# Patient Record
Sex: MALE | Race: WHITE | NOT HISPANIC OR LATINO | Employment: UNEMPLOYED | URBAN - METROPOLITAN AREA
[De-identification: names, ages, dates, MRNs, and addresses within clinical notes are randomized per-mention and may not be internally consistent; named-entity substitution may affect disease eponyms.]

---

## 2022-08-18 ENCOUNTER — HOSPITAL ENCOUNTER (INPATIENT)
Facility: CLINIC | Age: 42
LOS: 4 days | Discharge: HOME | DRG: 164 | End: 2022-08-23
Attending: HOSPITALIST | Admitting: HOSPITALIST

## 2022-08-18 ENCOUNTER — TRANSFERRED RECORDS (OUTPATIENT)
Dept: HEALTH INFORMATION MANAGEMENT | Facility: CLINIC | Age: 42
End: 2022-08-18

## 2022-08-18 DIAGNOSIS — I26.99 PULMONARY EMBOLISM, OTHER, UNSPECIFIED CHRONICITY, UNSPECIFIED WHETHER ACUTE COR PULMONALE PRESENT (H): Primary | ICD-10-CM

## 2022-08-18 DIAGNOSIS — E11.69 TYPE 2 DIABETES MELLITUS WITH OTHER SPECIFIED COMPLICATION, UNSPECIFIED WHETHER LONG TERM INSULIN USE (H): ICD-10-CM

## 2022-08-18 DIAGNOSIS — I82.4Y1 ACUTE DEEP VEIN THROMBOSIS (DVT) OF PROXIMAL VEIN OF RIGHT LOWER EXTREMITY (H): ICD-10-CM

## 2022-08-18 DIAGNOSIS — I26.92 ACUTE SADDLE PULMONARY EMBOLISM WITHOUT ACUTE COR PULMONALE (H): ICD-10-CM

## 2022-08-18 LAB
ALBUMIN SERPL-MCNC: 2.3 G/DL (ref 3.4–5)
ALP SERPL-CCNC: 100 U/L (ref 40–150)
ALT SERPL W P-5'-P-CCNC: 30 U/L (ref 0–70)
ANION GAP SERPL CALCULATED.3IONS-SCNC: 5 MMOL/L (ref 3–14)
AST SERPL W P-5'-P-CCNC: 47 U/L (ref 0–45)
BILIRUB SERPL-MCNC: 3.1 MG/DL (ref 0.2–1.3)
BUN SERPL-MCNC: 7 MG/DL (ref 7–30)
CALCIUM SERPL-MCNC: 8.3 MG/DL (ref 8.5–10.1)
CHLORIDE BLD-SCNC: 102 MMOL/L (ref 94–109)
CO2 SERPL-SCNC: 26 MMOL/L (ref 20–32)
CREAT SERPL-MCNC: 0.64 MG/DL (ref 0.66–1.25)
ERYTHROCYTE [DISTWIDTH] IN BLOOD BY AUTOMATED COUNT: 12.3 % (ref 10–15)
GFR SERPL CREATININE-BSD FRML MDRD: >90 ML/MIN/1.73M2
GLUCOSE BLD-MCNC: 161 MG/DL (ref 70–99)
HCT VFR BLD AUTO: 44.2 % (ref 40–53)
HGB BLD-MCNC: 15.4 G/DL (ref 13.3–17.7)
INR PPP: 1.52 (ref 0.85–1.15)
MAGNESIUM SERPL-MCNC: 1.9 MG/DL (ref 1.6–2.3)
MCH RBC QN AUTO: 33.2 PG (ref 26.5–33)
MCHC RBC AUTO-ENTMCNC: 34.8 G/DL (ref 31.5–36.5)
MCV RBC AUTO: 95 FL (ref 78–100)
PLATELET # BLD AUTO: 115 10E3/UL (ref 150–450)
POTASSIUM BLD-SCNC: 4.3 MMOL/L (ref 3.4–5.3)
PROT SERPL-MCNC: 5.7 G/DL (ref 6.8–8.8)
RBC # BLD AUTO: 4.64 10E6/UL (ref 4.4–5.9)
SODIUM SERPL-SCNC: 133 MMOL/L (ref 133–144)
TROPONIN I SERPL HS-MCNC: 11 NG/L
WBC # BLD AUTO: 7.3 10E3/UL (ref 4–11)

## 2022-08-18 PROCEDURE — 99223 1ST HOSP IP/OBS HIGH 75: CPT | Mod: AI | Performed by: HOSPITALIST

## 2022-08-18 PROCEDURE — 84484 ASSAY OF TROPONIN QUANT: CPT | Performed by: HOSPITALIST

## 2022-08-18 PROCEDURE — 250N000011 HC RX IP 250 OP 636: Performed by: HOSPITALIST

## 2022-08-18 PROCEDURE — 85027 COMPLETE CBC AUTOMATED: CPT | Performed by: HOSPITALIST

## 2022-08-18 PROCEDURE — 36415 COLL VENOUS BLD VENIPUNCTURE: CPT | Performed by: HOSPITALIST

## 2022-08-18 PROCEDURE — 83735 ASSAY OF MAGNESIUM: CPT | Performed by: HOSPITALIST

## 2022-08-18 PROCEDURE — 80053 COMPREHEN METABOLIC PANEL: CPT | Performed by: HOSPITALIST

## 2022-08-18 PROCEDURE — 85610 PROTHROMBIN TIME: CPT | Performed by: HOSPITALIST

## 2022-08-18 PROCEDURE — 82040 ASSAY OF SERUM ALBUMIN: CPT | Performed by: HOSPITALIST

## 2022-08-18 RX ORDER — SODIUM CHLORIDE 9 MG/ML
INJECTION, SOLUTION INTRAVENOUS CONTINUOUS
Status: DISCONTINUED | OUTPATIENT
Start: 2022-08-19 | End: 2022-08-20

## 2022-08-18 RX ORDER — HEPARIN SODIUM 10000 [USP'U]/100ML
0-5000 INJECTION, SOLUTION INTRAVENOUS CONTINUOUS
Status: DISCONTINUED | OUTPATIENT
Start: 2022-08-18 | End: 2022-08-21

## 2022-08-18 RX ADMIN — HEPARIN SODIUM 1800 UNITS/HR: 10000 INJECTION, SOLUTION INTRAVENOUS at 23:22

## 2022-08-18 ASSESSMENT — ACTIVITIES OF DAILY LIVING (ADL): ADLS_ACUITY_SCORE: 26

## 2022-08-19 ENCOUNTER — APPOINTMENT (OUTPATIENT)
Dept: INTERVENTIONAL RADIOLOGY/VASCULAR | Facility: CLINIC | Age: 42
DRG: 164 | End: 2022-08-19
Attending: HOSPITALIST

## 2022-08-19 ENCOUNTER — APPOINTMENT (OUTPATIENT)
Dept: CARDIOLOGY | Facility: CLINIC | Age: 42
DRG: 164 | End: 2022-08-19
Attending: HOSPITALIST

## 2022-08-19 ENCOUNTER — APPOINTMENT (OUTPATIENT)
Dept: ULTRASOUND IMAGING | Facility: CLINIC | Age: 42
DRG: 164 | End: 2022-08-19
Attending: HOSPITALIST

## 2022-08-19 PROBLEM — I26.99 PULMONARY EMBOLISM (H): Status: ACTIVE | Noted: 2022-08-19

## 2022-08-19 LAB
ACT BLD: 233 SECONDS (ref 74–150)
ACT BLD: 237 SECONDS (ref 74–150)
ANION GAP SERPL CALCULATED.3IONS-SCNC: 6 MMOL/L (ref 3–14)
BUN SERPL-MCNC: 7 MG/DL (ref 7–30)
CALCIUM SERPL-MCNC: 8.2 MG/DL (ref 8.5–10.1)
CHLORIDE BLD-SCNC: 103 MMOL/L (ref 94–109)
CO2 SERPL-SCNC: 24 MMOL/L (ref 20–32)
CREAT SERPL-MCNC: 0.61 MG/DL (ref 0.66–1.25)
ERYTHROCYTE [DISTWIDTH] IN BLOOD BY AUTOMATED COUNT: 12.4 % (ref 10–15)
FACTOR 2 INTERPRETATION: ABNORMAL
FACTOR V INTERPRETATION: ABNORMAL
FOLATE SERPL-MCNC: 8.1 NG/ML (ref 4.6–34.8)
GFR SERPL CREATININE-BSD FRML MDRD: >90 ML/MIN/1.73M2
GLUCOSE BLD-MCNC: 138 MG/DL (ref 70–99)
HCT VFR BLD AUTO: 42.3 % (ref 40–53)
HGB BLD-MCNC: 14.3 G/DL (ref 13.3–17.7)
LAB DIRECTOR COMMENTS: ABNORMAL
LAB DIRECTOR DISCLAIMER: ABNORMAL
LAB DIRECTOR INTERPRETATION: ABNORMAL
LAB DIRECTOR METHODOLOGY: ABNORMAL
LAB DIRECTOR RESULTS: ABNORMAL
LVEF ECHO: NORMAL
MCH RBC QN AUTO: 32.6 PG (ref 26.5–33)
MCHC RBC AUTO-ENTMCNC: 33.8 G/DL (ref 31.5–36.5)
MCV RBC AUTO: 96 FL (ref 78–100)
PLATELET # BLD AUTO: 113 10E3/UL (ref 150–450)
POTASSIUM BLD-SCNC: 4.2 MMOL/L (ref 3.4–5.3)
RBC # BLD AUTO: 4.39 10E6/UL (ref 4.4–5.9)
SODIUM SERPL-SCNC: 133 MMOL/L (ref 133–144)
SPECIMEN DESCRIPTION: ABNORMAL
UFH PPP CHRO-ACNC: 0.11 IU/ML
UFH PPP CHRO-ACNC: 0.22 IU/ML
VIT B12 SERPL-MCNC: 1319 PG/ML (ref 232–1245)
WBC # BLD AUTO: 6.5 10E3/UL (ref 4–11)

## 2022-08-19 PROCEDURE — C1769 GUIDE WIRE: HCPCS

## 2022-08-19 PROCEDURE — 85520 HEPARIN ASSAY: CPT | Performed by: HOSPITALIST

## 2022-08-19 PROCEDURE — 250N000011 HC RX IP 250 OP 636: Performed by: NURSE PRACTITIONER

## 2022-08-19 PROCEDURE — 258N000003 HC RX IP 258 OP 636: Performed by: HOSPITALIST

## 2022-08-19 PROCEDURE — 37191 INS ENDOVAS VENA CAVA FILTR: CPT

## 2022-08-19 PROCEDURE — C1880 VENA CAVA FILTER: HCPCS

## 2022-08-19 PROCEDURE — 93005 ELECTROCARDIOGRAM TRACING: CPT

## 2022-08-19 PROCEDURE — 99222 1ST HOSP IP/OBS MODERATE 55: CPT | Performed by: INTERNAL MEDICINE

## 2022-08-19 PROCEDURE — 93010 ELECTROCARDIOGRAM REPORT: CPT | Performed by: INTERNAL MEDICINE

## 2022-08-19 PROCEDURE — 210N000002 HC R&B HEART CARE

## 2022-08-19 PROCEDURE — 36415 COLL VENOUS BLD VENIPUNCTURE: CPT | Performed by: HOSPITALIST

## 2022-08-19 PROCEDURE — 255N000002 HC RX 255 OP 636: Performed by: HOSPITALIST

## 2022-08-19 PROCEDURE — 250N000011 HC RX IP 250 OP 636: Performed by: RADIOLOGY

## 2022-08-19 PROCEDURE — 272N000209 HC BALLOON (NON-PTA) CR6

## 2022-08-19 PROCEDURE — 85027 COMPLETE CBC AUTOMATED: CPT | Performed by: HOSPITALIST

## 2022-08-19 PROCEDURE — 255N000002 HC RX 255 OP 636: Performed by: RADIOLOGY

## 2022-08-19 PROCEDURE — 36014 PLACE CATHETER IN ARTERY: CPT

## 2022-08-19 PROCEDURE — 272N000116 HC CATH CR1

## 2022-08-19 PROCEDURE — 83036 HEMOGLOBIN GLYCOSYLATED A1C: CPT | Performed by: HOSPITALIST

## 2022-08-19 PROCEDURE — 250N000009 HC RX 250: Performed by: NURSE PRACTITIONER

## 2022-08-19 PROCEDURE — 93306 TTE W/DOPPLER COMPLETE: CPT | Mod: 26 | Performed by: INTERNAL MEDICINE

## 2022-08-19 PROCEDURE — 85520 HEPARIN ASSAY: CPT | Performed by: INTERNAL MEDICINE

## 2022-08-19 PROCEDURE — 86146 BETA-2 GLYCOPROTEIN ANTIBODY: CPT | Performed by: INTERNAL MEDICINE

## 2022-08-19 PROCEDURE — 93971 EXTREMITY STUDY: CPT | Mod: LT

## 2022-08-19 PROCEDURE — 82746 ASSAY OF FOLIC ACID SERUM: CPT | Performed by: INTERNAL MEDICINE

## 2022-08-19 PROCEDURE — 86147 CARDIOLIPIN ANTIBODY EA IG: CPT | Performed by: INTERNAL MEDICINE

## 2022-08-19 PROCEDURE — 82607 VITAMIN B-12: CPT | Performed by: INTERNAL MEDICINE

## 2022-08-19 PROCEDURE — 36015 PLACE CATHETER IN ARTERY: CPT

## 2022-08-19 PROCEDURE — G0452 MOLECULAR PATHOLOGY INTERPR: HCPCS | Mod: 26 | Performed by: PATHOLOGY

## 2022-08-19 PROCEDURE — 272N000194 HC ACCESSORY CR3

## 2022-08-19 PROCEDURE — 272N000566 HC SHEATH CR3

## 2022-08-19 PROCEDURE — 81240 F2 GENE: CPT | Performed by: INTERNAL MEDICINE

## 2022-08-19 PROCEDURE — 85347 COAGULATION TIME ACTIVATED: CPT

## 2022-08-19 PROCEDURE — 36415 COLL VENOUS BLD VENIPUNCTURE: CPT | Performed by: INTERNAL MEDICINE

## 2022-08-19 PROCEDURE — 999N000208 ECHOCARDIOGRAM COMPLETE

## 2022-08-19 PROCEDURE — 250N000011 HC RX IP 250 OP 636: Performed by: HOSPITALIST

## 2022-08-19 PROCEDURE — 82310 ASSAY OF CALCIUM: CPT | Performed by: HOSPITALIST

## 2022-08-19 PROCEDURE — C1757 CATH, THROMBECTOMY/EMBOLECT: HCPCS

## 2022-08-19 PROCEDURE — 99233 SBSQ HOSP IP/OBS HIGH 50: CPT | Performed by: INTERNAL MEDICINE

## 2022-08-19 PROCEDURE — 272N000196 HC ACCESSORY CR5

## 2022-08-19 PROCEDURE — 99152 MOD SED SAME PHYS/QHP 5/>YRS: CPT

## 2022-08-19 RX ORDER — NALOXONE HYDROCHLORIDE 0.4 MG/ML
0.4 INJECTION, SOLUTION INTRAMUSCULAR; INTRAVENOUS; SUBCUTANEOUS
Status: DISCONTINUED | OUTPATIENT
Start: 2022-08-19 | End: 2022-08-19 | Stop reason: HOSPADM

## 2022-08-19 RX ORDER — IOPAMIDOL 612 MG/ML
100 INJECTION, SOLUTION INTRAVASCULAR ONCE
Status: COMPLETED | OUTPATIENT
Start: 2022-08-19 | End: 2022-08-19

## 2022-08-19 RX ORDER — NALOXONE HYDROCHLORIDE 0.4 MG/ML
0.2 INJECTION, SOLUTION INTRAMUSCULAR; INTRAVENOUS; SUBCUTANEOUS
Status: DISCONTINUED | OUTPATIENT
Start: 2022-08-19 | End: 2022-08-23 | Stop reason: HOSPADM

## 2022-08-19 RX ORDER — LACTULOSE 10 G/15ML
30 SOLUTION ORAL 3 TIMES DAILY
COMMUNITY

## 2022-08-19 RX ORDER — FUROSEMIDE 40 MG
40 TABLET ORAL EVERY MORNING
COMMUNITY

## 2022-08-19 RX ORDER — AMOXICILLIN 250 MG
1 CAPSULE ORAL 2 TIMES DAILY PRN
Status: DISCONTINUED | OUTPATIENT
Start: 2022-08-19 | End: 2022-08-23 | Stop reason: HOSPADM

## 2022-08-19 RX ORDER — FLUMAZENIL 0.1 MG/ML
0.2 INJECTION, SOLUTION INTRAVENOUS
Status: DISCONTINUED | OUTPATIENT
Start: 2022-08-19 | End: 2022-08-19 | Stop reason: HOSPADM

## 2022-08-19 RX ORDER — NALOXONE HYDROCHLORIDE 0.4 MG/ML
0.4 INJECTION, SOLUTION INTRAMUSCULAR; INTRAVENOUS; SUBCUTANEOUS
Status: DISCONTINUED | OUTPATIENT
Start: 2022-08-19 | End: 2022-08-23 | Stop reason: HOSPADM

## 2022-08-19 RX ORDER — HEPARIN SODIUM 1000 [USP'U]/ML
1000-10000 INJECTION, SOLUTION INTRAVENOUS; SUBCUTANEOUS ONCE
Status: COMPLETED | OUTPATIENT
Start: 2022-08-19 | End: 2022-08-19

## 2022-08-19 RX ORDER — FENTANYL CITRATE 50 UG/ML
25-50 INJECTION, SOLUTION INTRAMUSCULAR; INTRAVENOUS EVERY 5 MIN PRN
Status: DISCONTINUED | OUTPATIENT
Start: 2022-08-19 | End: 2022-08-19 | Stop reason: HOSPADM

## 2022-08-19 RX ORDER — LIDOCAINE 40 MG/G
CREAM TOPICAL
Status: DISCONTINUED | OUTPATIENT
Start: 2022-08-19 | End: 2022-08-23 | Stop reason: HOSPADM

## 2022-08-19 RX ORDER — SPIRONOLACTONE 100 MG/1
100 TABLET, FILM COATED ORAL EVERY MORNING
COMMUNITY

## 2022-08-19 RX ORDER — ONDANSETRON 2 MG/ML
4 INJECTION INTRAMUSCULAR; INTRAVENOUS EVERY 6 HOURS PRN
Status: DISCONTINUED | OUTPATIENT
Start: 2022-08-19 | End: 2022-08-23 | Stop reason: HOSPADM

## 2022-08-19 RX ORDER — NALOXONE HYDROCHLORIDE 0.4 MG/ML
0.2 INJECTION, SOLUTION INTRAMUSCULAR; INTRAVENOUS; SUBCUTANEOUS
Status: DISCONTINUED | OUTPATIENT
Start: 2022-08-19 | End: 2022-08-19 | Stop reason: HOSPADM

## 2022-08-19 RX ORDER — ACETAMINOPHEN 325 MG/1
325 TABLET ORAL EVERY 6 HOURS PRN
Status: DISCONTINUED | OUTPATIENT
Start: 2022-08-19 | End: 2022-08-23 | Stop reason: HOSPADM

## 2022-08-19 RX ORDER — AMOXICILLIN 250 MG
2 CAPSULE ORAL 2 TIMES DAILY PRN
Status: DISCONTINUED | OUTPATIENT
Start: 2022-08-19 | End: 2022-08-23 | Stop reason: HOSPADM

## 2022-08-19 RX ORDER — HEPARIN SODIUM 200 [USP'U]/100ML
1 INJECTION, SOLUTION INTRAVENOUS CONTINUOUS PRN
Status: DISCONTINUED | OUTPATIENT
Start: 2022-08-19 | End: 2022-08-19 | Stop reason: HOSPADM

## 2022-08-19 RX ORDER — ONDANSETRON 4 MG/1
4 TABLET, ORALLY DISINTEGRATING ORAL EVERY 6 HOURS PRN
Status: DISCONTINUED | OUTPATIENT
Start: 2022-08-19 | End: 2022-08-23 | Stop reason: HOSPADM

## 2022-08-19 RX ORDER — HYDROMORPHONE HCL IN WATER/PF 6 MG/30 ML
0.2 PATIENT CONTROLLED ANALGESIA SYRINGE INTRAVENOUS
Status: DISCONTINUED | OUTPATIENT
Start: 2022-08-19 | End: 2022-08-23 | Stop reason: HOSPADM

## 2022-08-19 RX ADMIN — MIDAZOLAM HYDROCHLORIDE 1 MG: 1 INJECTION, SOLUTION INTRAMUSCULAR; INTRAVENOUS at 20:45

## 2022-08-19 RX ADMIN — FENTANYL CITRATE 50 MCG: 50 INJECTION, SOLUTION INTRAMUSCULAR; INTRAVENOUS at 20:09

## 2022-08-19 RX ADMIN — FENTANYL CITRATE 50 MCG: 50 INJECTION, SOLUTION INTRAMUSCULAR; INTRAVENOUS at 19:51

## 2022-08-19 RX ADMIN — IOPAMIDOL 148 ML: 612 INJECTION, SOLUTION INTRAVENOUS at 21:27

## 2022-08-19 RX ADMIN — FENTANYL CITRATE 50 MCG: 50 INJECTION, SOLUTION INTRAMUSCULAR; INTRAVENOUS at 19:46

## 2022-08-19 RX ADMIN — MIDAZOLAM HYDROCHLORIDE 1 MG: 1 INJECTION, SOLUTION INTRAMUSCULAR; INTRAVENOUS at 19:51

## 2022-08-19 RX ADMIN — MIDAZOLAM HYDROCHLORIDE 1 MG: 1 INJECTION, SOLUTION INTRAMUSCULAR; INTRAVENOUS at 19:46

## 2022-08-19 RX ADMIN — HUMAN ALBUMIN MICROSPHERES AND PERFLUTREN 3 ML: 10; .22 INJECTION, SOLUTION INTRAVENOUS at 09:19

## 2022-08-19 RX ADMIN — LIDOCAINE HYDROCHLORIDE 20 ML: 10 INJECTION, SOLUTION INFILTRATION; PERINEURAL at 19:46

## 2022-08-19 RX ADMIN — HEPARIN SODIUM 2250 UNITS/HR: 10000 INJECTION, SOLUTION INTRAVENOUS at 22:15

## 2022-08-19 RX ADMIN — SODIUM CHLORIDE: 9 INJECTION, SOLUTION INTRAVENOUS at 01:08

## 2022-08-19 RX ADMIN — HEPARIN SODIUM 5 BAG: 200 INJECTION, SOLUTION INTRAVENOUS at 20:06

## 2022-08-19 RX ADMIN — HEPARIN SODIUM 1000 UNITS: 1000 INJECTION INTRAVENOUS; SUBCUTANEOUS at 20:40

## 2022-08-19 RX ADMIN — HEPARIN SODIUM 2100 UNITS/HR: 10000 INJECTION, SOLUTION INTRAVENOUS at 10:12

## 2022-08-19 RX ADMIN — FENTANYL CITRATE 50 MCG: 50 INJECTION, SOLUTION INTRAMUSCULAR; INTRAVENOUS at 20:45

## 2022-08-19 RX ADMIN — SODIUM CHLORIDE: 9 INJECTION, SOLUTION INTRAVENOUS at 13:46

## 2022-08-19 RX ADMIN — MIDAZOLAM HYDROCHLORIDE 1 MG: 1 INJECTION, SOLUTION INTRAMUSCULAR; INTRAVENOUS at 20:09

## 2022-08-19 ASSESSMENT — ACTIVITIES OF DAILY LIVING (ADL)
ADLS_ACUITY_SCORE: 26
ADLS_ACUITY_SCORE: 28
ADLS_ACUITY_SCORE: 26
ADLS_ACUITY_SCORE: 26

## 2022-08-19 NOTE — PROGRESS NOTES
Admitted to Room 274 from OSH at 2230.   Neuro: A&Ox4, denies pain. Neuros intact.   CV: ST  Respiratory: on RA  GI/: uses urinal   Skin: intact  Activity: SBA   Diet: NPO at midnight   Drips: started Hep gtt @1800   Plan: Waiting for MD orders. Report given to nurse taking over at 2300.

## 2022-08-19 NOTE — CONSULTS
Interventional Radiology - Pre-Procedure Note:  8/19/2022    Procedure Requested: Possible Pete pulmonary thrombectomy  Requested by: Dr Pino     Brief HPI:Jareth Limon is a 42 year old male with a past medical history of hypertension, alcoholic liver cirrhosis with ascites who presented to hospital with right lower extremity swelling and pain found to have submassive PE without right heart strain and right lower extremity DVT.    Anil was seen in his room today. His breathing and right leg pain are normal at bedrest.  He has noted some increase in SOB for the past few weeks but it has become worse. His right leg swelling though has been bothersome for ~ 1 week and the pain in his posterior calf and thigh became worse the past few days. He has some numbness and tingling in his right foot. His pain is worst with getting up out a lying situation. He wanted to know if IR was going to remove blood clots in his right leg too since that was so painful.     IMAGING:  EXAM: US LOWER EXTREMITY VENOUS DUPLEX LEFT  LOCATION: [facility]  DATE/TIME: 8/19/2022 6:10 AM     INDICATION: patient with pe and right lower extremity dvt, noted to have left leg swelling too.  COMPARISON: None.  TECHNIQUE: Venous Duplex ultrasound of the left lower extremity with and without compression, augmentation and duplex. Color flow and spectral Doppler with waveform analysis performed.     FINDINGS: Exam includes the common femoral, femoral, popliteal, and contralateral common femoral veins as well as segmentally visualized deep calf veins and greater saphenous vein.      LEFT: No deep vein thrombosis. No superficial thrombophlebitis. No popliteal cyst.  Nonocclusive DVT noted in the right common femoral vein. Patient has known DVT right leg.                                                                   IMPRESSION:  1.  No deep venous thrombosis in the left lower extremity.  2.  Nonocclusive thrombus right common  femoral vein. Remainder of right leg not evaluated.    CT chest  Bilateral submassive PE     [facility]  Echocardiography Laboratory  [address] [place] [address]     Name: NICK HENDERSON  MRN: [id number]  : 1980  Study Date: 2022 08:49 AM  Age: 42 yrs  Gender: Male  Patient Location: [facility]  Reason For Study: Pulmonary Embolism  Ordering Physician: KASI TAN  Referring Physician: Tiago Galeana  Performed By: Treasure Davidson RDCS     BSA: 2.7 m2  Height: 74 in  Weight: 327 lb  HR: 111  BP: 143/93 mmHg  _______________________________________________________________  Procedure  Complete Echo Adult. Optison (NDC #[id number]) given intravenously. Technically  difficult study.  ________________________________________________________________  Interpretation Summary     The rhythm was sinus tachycardia.  The visual ejection fraction is 60-65%.  The right ventricle is normal in size and function.  Right ventricular systolic pressure could not be approximated due to  inadequate tricuspid regurgitation.  Dilated pulmonary arteries. Main PA measures 4 cms.  No significant valve disease.  There is no pericardial effusion.  Technically difficult study.    NPO: Yes  ANTICOAGULANTS: Yes, IV Heparin    Medications Prior to Admission   Medication Sig Dispense Refill Last Dose     furosemide (LASIX) 40 MG tablet Take 40 mg by mouth every morning        lactulose (CHRONULAC) 10 GM/15ML solution Take 30 g by mouth 3 times daily   few weeks ago - ran out     metFORMIN (GLUCOPHAGE) 500 MG tablet Take 500 mg by mouth 2 times daily (with meals)        spironolactone (ALDACTONE) 100 MG tablet Take 100 mg by mouth every morning        ALLERGIES  Allergies   Allergen Reactions     Amoxicillin Hives     Per patient     LABS:  ROUTINE ICU LABS (Last four results)  CMPRecent Labs   Lab 22  0751 22  2251    133   POTASSIUM 4.2 4.3   CHLORIDE 103 102   CO2 24  26   ANIONGAP 6 5   * 161*   BUN 7 7   CR 0.61* 0.64*   GFRESTIMATED >90 >90   SHERI 8.2* 8.3*   MAG  --  1.9   PROTTOTAL  --  5.7*   ALBUMIN  --  2.3*   BILITOTAL  --  3.1*   ALKPHOS  --  100   AST  --  47*   ALT  --  30     CBC  Recent Labs   Lab 08/19/22  0751 08/18/22  2251   WBC 6.5 7.3   RBC 4.39* 4.64   HGB 14.3 15.4   HCT 42.3 44.2   MCV 96 95   MCH 32.6 33.2*   MCHC 33.8 34.8   RDW 12.4 12.3   * 115*     INR  Recent Labs   Lab 08/18/22  2251   INR 1.52*     Arterial Blood GasNo lab results found in last 7 days.    EXAM:  Temp:  [98.3  F (36.8  C)-98.4  F (36.9  C)] 98.4  F (36.9  C)  Pulse:  [105-111] 110  Resp:  [18-23] 22  BP: ()/(59-93) 98/59  SpO2:  [91 %-99 %] 91 %     General: Careful, cooperative male in no acute distress.    Neuro:  A&O x 3. Moves all extremities equally.  Resp:  Lungs clear to auscultation bilaterally.  Cardio:  S1S2 and reg, tachycardic, without murmur, clicks or rubs  Abdomen:  Soft, distended, non-tender, positive bowel sounds.  Vascular: +2/4 bilateral dorsalis pedis pulses     Right leg > than the left leg and legs soft. Puffy edema on right foot. He denies having issues with edema.   Skin:  Without excoriations, ecchymosis, erythema, lesions or open sores.  MSK:  No gross motor weakness.  Sensation intact.  Proprioception intact.    Pre-Sedation Code Status Assessment:  Code Status: Full Code intra procedure, per chart review. .     History and Physical Reviewed: H&P documented within 30 days.  I have personally reviewed the patient's medical history and have updated the medical record as necessary.    ASSESSMENT/PLAN: Jareth Limon is a 42 year old male with a past medical history of hypertension, alcoholic liver cirrhosis with ascites who presented to hospital with right lower extremity swelling and pain found to have submassive PE without right heart strain and right lower extremity DVT.    -Patient is stable at rest but with activity he has SOB and right  leg pain.   -Continue NPO today for PE thrombectomy    Reviewed patient's notes, labs and imaging with IR Dr Sloan. He is here on Monday and will follow on the progress of Jareth. He may consider a RLE thrombectomy at that time.     Procedure, risks/benefits, details, alternatives, and sedation reviewed with patient who verbalized understanding. All questions answered. OK to proceed with above radiology procedure.     Thanks [facility] Interventional Radiology CNP ([phone]) (phone [phone])

## 2022-08-19 NOTE — PLAN OF CARE
Pt stable over night, VSS, no c/o Chest pain or sob.  Tele:SR/ST.  Heparin gtt infusing without problems, no bleeding noted.  LE U/S completed.  RLE pedal pulses are doppler, LLE pedal pulses are 2+.  Pt has been NPO and is awaiting an IR consult this AM.  No new issues noted.

## 2022-08-19 NOTE — H&P
[facility]    History and Physical  Hospitalist     Date of Admission:  8/18/2022    Assessment & Plan   Jareth Limon is a 42 year old male with a past medical history of with a past medical history of hypertension, alcoholic liver cirrhosis with ascites presents to hospital with right lower extremity swelling and pain found to have submassive PE without right heart strain and right lower extremity DVT.    Acute right dvt  Submassive pe  Patient presented to [facility] with right lower extremities pain and swelling found to have extensive DVT of right lower extremity as well as submassive PE.  Case was discussed with IR and patient was transferred to [facility] for likely thrombectomy in the morning.  Patient was started on anticoagulation with heparin.  On my exam I noticed that the patient also has left lower extremity swelling therefore will obtain duplex of the left lower extremity.  No signs of heart strain on CT scan and troponin within normal limits and patient is chest pain-free.  Will monitor on telemetry.  If he decompensates overnight will request IR to come in emergently.  Imaging CD received from [facility], I have requested that the images be uploaded..  -npo after midnight  -f/u us of left lower extremity  -Monitor on telemetry, continuous pulse oximetry  -ir consult for thrombectomy in the morning  -heparin drip    transaminiitis  Alcoholic liver cirrhosis with ascites   hyponatremia  Coagulopathy  Patient with transaminitis, hyponatremia, mildly elevated INR all likely secondary to his alcoholic liver cirrhosis.  The patient has been abstinent of alcohol for the last 6 weeks.  He had a paracentesis performed approximately 1 month ago and reports that his abdomen has been significantly less distended since then.  He does not have a hepatologist yet but is in the process of establishing care.  -Monitor     Hypertension  -Hold PTA antihypertensives in the  setting of submassive PE.  Will resume as needed.    Code Status   Full Code  DVT ppx: heparin drip  Expected length of stay greater than 2 days    Primary Care Physician   Lissett Gupta    Chief Complaint   Lower extremity pain and swelling    History obtained from the patient    History of Present Illness   Jareth Limon is a 42 year old male with a past medical history of with a past medical history of hypertension, alcoholic liver cirrhosis with ascites presents to hospital with right lower extremity swelling and pain found to have submassive PE without right heart strain and right lower extremity DVT.  The patient presented to [facility] on August 18 complaining of right lower extremity pain and swelling.  He reports that swelling and pain have been ongoing for at least 4 days.  Pain is worse when he standing or the leg is lower than his heart and improved with leg elevation.  At [facility] he was found to have a pulmonary embolism as well as a massive right lower extremity DVT and thrombectomy was recommended.  Case was discussed with IR and the patient was transferred to [facility] for thrombectomy in the morning.  On arrival the patient denies any acute complaints.  He reports that he has noted shortness of breath for the last 2 weeks.  He denies any chest pain or palpitations.  He has never had blood clots in the past.  He reports that he has been abstinent of alcohol for the last 6 weeks.      Past Medical History    I have reviewed this patient's medical history and updated it with pertinent information if needed.   Past Medical History:   Diagnosis Date     Alcoholic cirrhosis of liver with ascites (H)      Diabetes mellitus, type 2 (H)      HTN (hypertension)      DAMIAN (obstructive sleep apnea)     uses cpap       Past Surgical History   I have reviewed this patient's surgical history and updated it with pertinent information if needed.  Past Surgical History:    Procedure Laterality Date     GI SURGERY  1999    gall bladder removed       Prior to Admission Medications   Prior to Admission Medications   Prescriptions Last Dose Informant Patient Reported? Taking?   lisinopril-hydrochlorothiazide (PRINZIDE,ZESTORETIC) 10-12.5 MG per tablet   No No   Sig: Take 1 tablet by mouth daily.      Facility-Administered Medications: None     Allergies   Allergies   Allergen Reactions     Amoxicillin Hives     Per patient       Social History   I have reviewed this patient's social history and updated it with pertinent information if needed. Jareth Limon  reports that he has been smoking. He has a 17.00 pack-year smoking history. His smokeless tobacco use includes chew. He reports current alcohol use. He reports that he does not use drugs.    Family History   I have reviewed this patient's family history and updated it with pertinent information if needed.   Family History   Problem Relation Age of Onset     Diabetes Mother      Obesity Mother      Alcohol/Drug Father      Alcohol/Drug Maternal Grandmother      Cerebrovascular Disease Maternal Grandfather      Diabetes Maternal Grandfather      Alcohol/Drug Maternal Grandfather      Hearing Loss Maternal Grandfather      Alcohol/Drug Paternal Grandmother      Alcohol/Drug Paternal Grandfather      Asthma No family hx of      C.A.D. No family hx of      Hypertension No family hx of      Breast Cancer No family hx of      Cancer - colorectal No family hx of      Prostate Cancer No family hx of      Cancer No family hx of      Allergies No family hx of      Alzheimer Disease No family hx of      Anesthesia Reaction No family hx of      Arthritis No family hx of      Blood Disease No family hx of      Cardiovascular No family hx of      Circulatory No family hx of      Congenital Anomalies No family hx of      Connective Tissue Disorder No family hx of      Depression No family hx of      Endocrine Disease No family hx of      Eye Disorder  No family hx of      Genetic Disorder No family hx of      Gastrointestinal Disease No family hx of      Genitourinary Problems No family hx of      Gynecology No family hx of      Heart Disease No family hx of      Lipids No family hx of      Musculoskeletal Disorder No family hx of      Neurologic Disorder No family hx of      Osteoporosis No family hx of      Psychotic Disorder No family hx of      Respiratory No family hx of      Thyroid Disease No family hx of        Review of Systems   The 10 point Review of Systems is negative other than noted in the HPI or here.     Physical Exam   Temp: 98.3  F (36.8  C) Temp src: Oral BP: (!) 143/93 Pulse: 111   Resp: 18 SpO2: 99 % O2 Device: None (Room air)    Vital Signs with Ranges  Temp:  [98.3  F (36.8  C)] 98.3  F (36.8  C)  Pulse:  [111] 111  Resp:  [18] 18  BP: (143)/(93) 143/93  SpO2:  [99 %] 99 %  327 lbs 9.6 oz  Physical Exam  Vitals reviewed.   Constitutional:       Appearance: Normal appearance. He is obese.      Comments: Very pleasant gentleman seen resting in bed comfortably in no apparent distress.   HENT:      Head: Normocephalic and atraumatic.      Mouth/Throat:      Mouth: Mucous membranes are moist.      Pharynx: Oropharynx is clear.   Eyes:      Extraocular Movements: Extraocular movements intact.      Conjunctiva/sclera: Conjunctivae normal.      Pupils: Pupils are equal, round, and reactive to light.   Cardiovascular:      Rate and Rhythm: Regular rhythm. Tachycardia present.      Pulses: Normal pulses.      Heart sounds: Normal heart sounds. No murmur heard.  Pulmonary:      Effort: Pulmonary effort is normal.      Breath sounds: Normal breath sounds. No wheezing, rhonchi or rales.   Abdominal:      General: Abdomen is flat. Bowel sounds are normal. There is distension.      Palpations: Abdomen is soft. There is no mass.      Tenderness: There is no abdominal tenderness.   Musculoskeletal:         General: Swelling present. Normal range of motion.       Cervical back: Normal range of motion and neck supple.      Comments: Significant bilateral lower extremity swelling right greater than left.   Skin:     General: Skin is warm and dry.   Neurological:      General: No focal deficit present.      Mental Status: He is alert and oriented to person, place, and time. Mental status is at baseline.      Cranial Nerves: No cranial nerve deficit.           Ct pe 8/18/2022: 1. sub massive bilateral pe. No specific imaging findings of right heart strain. No evidence for pulmonary infarcts. 2. No aortic dissection or aneurysmal dilatation. 3. Chronic small left pleural effusion with calcifications of the pleura. Scarring in the lung bases more so on the left than on the right. 4. No significant mass or adenopathy in the chest. 5. Moderate volume ascites in the visualized upper abdomen.     US dop vein leg right 8/18/2022: extensive acute deep venous thrombosis involving essentially the entire right lower extremity, from the common femoral vein through the popliteal vein and into the calf veins, with only posterior tibial veins demonstrating patency.

## 2022-08-19 NOTE — CONSULTS
[facility]    Hematology/Oncology Consult Note      Date of Admission:  8/18/2022  Date of Consult:  08/19/22  Reason for Consult: submassive PE and Cirrhosis       ASSESSMENT/PLAN:  Jareth is a 42 year old male with past medical history significant for hypertension, alcoholic liver cirrhosis and ascites presenting to the hospital with right lower extremity edema and pain found to have a submassive PE and right lower extremity DVT     RF for thrombosis are morbid obesity, in addition to that liver cirrhosis can impact protein C levels making patients more hypercoagulable with a lack of protein C.  hypercoag work upbased on  his FH  My recommendation would be to continue anticoagulation minimum for 3 months, hypercoagulation work up ordered     1.  DVT: Currently on heparin. Creatinine is normal.  Would recommend anticoagulation with eliquis 10  Mg bid for  7 days then 5 mg bid indefinitely. IRconsult is pending. . 2.  Mild thrombocytopenia likely related to alcohol.  Check B12 levels.    Thank you for the consult    Lit Webb MD       HISTORY OF PRESENT ILLNESS: Jareth Limon is a 42 year old male who presents with past medical history significant for alcohol abuse, hypertension and liver cirrhosis who was presented to [facility] with right lower extremity swelling and pain x 4 days, he denies any SOB.  .  Further imaging With ultrasound showed no DVT in the left lower extremity nonocclusive thrombus in the right common femoral vein.  CT of the chest was obtained which showed submassive pulmonary emboli with no evidence of right ventricular strain.  He was transferred to [facility] for possible interventional radiologist considering a thrombectomy.  He is on heparin drip.  He states he has not been drinking alcohol for the last 6 weeks.  He is morbidly obese. He is very sedantary, he lives with his father . No recent trips .  Interventional consult is pending.     REVIEW  OF SYSTEMS:   14 point ROS was reviewed and is negative other than as noted above in HPI.       MEDICATIONS:  Current Facility-Administered Medications   Medication     acetaminophen (TYLENOL) tablet 325 mg     heparin 25,000 units in 0.45% NaCl 250 mL ANTICOAGULANT infusion     HYDROmorphone (DILAUDID) injection 0.2 mg     lidocaine (LMX4) cream     lidocaine 1 % 0.1-1 mL     melatonin tablet 1 mg     naloxone (NARCAN) injection 0.2 mg    Or     naloxone (NARCAN) injection 0.4 mg    Or     naloxone (NARCAN) injection 0.2 mg    Or     naloxone (NARCAN) injection 0.4 mg     ondansetron (ZOFRAN ODT) ODT tab 4 mg    Or     ondansetron (ZOFRAN) injection 4 mg     Patient is already receiving anticoagulation with heparin, enoxaparin (LOVENOX), warfarin (COUMADIN)  or other anticoagulant medication     senna-docusate (SENOKOT-S/PERICOLACE) 8.6-50 MG per tablet 1 tablet    Or     senna-docusate (SENOKOT-S/PERICOLACE) 8.6-50 MG per tablet 2 tablet     sodium chloride (PF) 0.9% PF flush 3 mL     sodium chloride (PF) 0.9% PF flush 3 mL     sodium chloride 0.9% infusion         ALLERGIES:  Allergies   Allergen Reactions     Amoxicillin Hives     Per patient         PAST MEDICAL HISTORY:  Past Medical History:   Diagnosis Date     Alcoholic cirrhosis of liver with ascites (H)      Diabetes mellitus, type 2 (H)      HTN (hypertension)      DAMIAN (obstructive sleep apnea)     uses cpap         PAST SURGICAL HISTORY:  Past Surgical History:   Procedure Laterality Date     GI SURGERY  1999    gall bladder removed         SOCIAL HISTORY:  Social History     Socioeconomic History     Marital status: Single     Spouse name: Not on file     Number of children: Not on file     Years of education: Not on file     Highest education level: Not on file   Occupational History     Not on file   Tobacco Use     Smoking status: Current Every Day Smoker     Packs/day: 1.00     Years: 17.00     Pack years: 17.00     Smokeless tobacco: Current User      Types: Chew   Substance and Sexual Activity     Alcohol use: Yes     Comment: used 3 x a week now once a week     Drug use: No     Comment: past use of marihuana, cocaine, lsd     Sexual activity: Never   Other Topics Concern     Parent/sibling w/ CABG, MI or angioplasty before 65F 55M? Not Asked   Social History Narrative     Not on file     Social Determinants of Health     Financial Resource Strain: Not on file   Food Insecurity: Not on file   Transportation Needs: Not on file   Physical Activity: Not on file   Stress: Not on file   Social Connections: Not on file   Intimate Partner Violence: Not on file   Housing Stability: Not on file         FAMILY HISTORY:  Family History   Problem Relation Age of Onset     Diabetes Mother      Obesity Mother      Alcohol/Drug Father      Alcohol/Drug Maternal Grandmother      Cerebrovascular Disease Maternal Grandfather      Diabetes Maternal Grandfather      Alcohol/Drug Maternal Grandfather      Hearing Loss Maternal Grandfather      Alcohol/Drug Paternal Grandmother      Alcohol/Drug Paternal Grandfather      Asthma No family hx of      C.A.D. No family hx of      Hypertension No family hx of      Breast Cancer No family hx of      Cancer - colorectal No family hx of      Prostate Cancer No family hx of      Cancer No family hx of      Allergies No family hx of      Alzheimer Disease No family hx of      Anesthesia Reaction No family hx of      Arthritis No family hx of      Blood Disease No family hx of      Cardiovascular No family hx of      Circulatory No family hx of      Congenital Anomalies No family hx of      Connective Tissue Disorder No family hx of      Depression No family hx of      Endocrine Disease No family hx of      Eye Disorder No family hx of      Genetic Disorder No family hx of      Gastrointestinal Disease No family hx of      Genitourinary Problems No family hx of      Gynecology No family hx of      Heart Disease No family hx of      Lipids  "No family hx of      Musculoskeletal Disorder No family hx of      Neurologic Disorder No family hx of      Osteoporosis No family hx of      Psychotic Disorder No family hx of      Respiratory No family hx of      Thyroid Disease No family hx of      Mother had PE    PHYSICAL EXAM:  Vital signs:  Temp: 98.4  F (36.9  C) Temp src: Oral BP: 98/59 Pulse: 110   Resp: 22 SpO2: 91 % O2 Device: None (Room air)     Weight: 148.5 kg (327 lb 6.4 oz)  Estimated body mass index is 39.42 kg/m  as calculated from the following:    Height as of 4/20/12: 1.893 m (6' 2.51\").    Weight as of 4/26/12: 141.2 kg (311 lb 4 oz).    ECOG: unable to assess patient laying in bed  GENERAL/CONSTITUTIONAL: No acute distress.  EYES: Pupils are equal, round, and react to light and accommodation. Extraocular movements intact.  No scleral icterus.  ENT/MOUTH: Neck supple. Oropharynx clear, no mucositis.  LYMPH: No anterior cervical, posterior cervical, supraclavicular, axillary or inguinal adenopathy.   RESPIRATORY: Clear to auscultation bilaterally. No crackles or wheezing.   CARDIOVASCULAR: Regular rate and rhythm without murmurs, gallops, or rubs.  GASTROINTESTINAL: No hepatosplenomegaly, masses, or tenderness. The patient has normal bowel sounds. No guarding.  No distention.  MUSCULOSKELETAL: RIght lower ext larger than LLE  NEUROLOGIC: Cranial nerves II-XII are intact. Alert, oriented, answers questions appropriately.  INTEGUMENTARY: No rashes or jaundice.         LABS:  CBC RESULTS:   Recent Labs   Lab Test 08/19/22  0751   WBC 6.5   RBC 4.39*   HGB 14.3   HCT 42.3   MCV 96   MCH 32.6   MCHC 33.8   RDW 12.4   *       Recent Labs   Lab Test 08/19/22  0751 08/18/22  2251    133   POTASSIUM 4.2 4.3   CHLORIDE 103 102   CO2 24 26   ANIONGAP 6 5   * 161*   BUN 7 7   CR 0.61* 0.64*   SHERI 8.2* 8.3*         PATHOLOGY:  na    IMAGING:    noted      Thank you for the opportunity to participate in this patient's care.  Please call " with any questions.    Lit Webb MD  Hematology/Oncology  [facility]

## 2022-08-19 NOTE — PRE-PROCEDURE
GENERAL PRE-PROCEDURE:   Procedure:  Bilateral pulmonary angiogram with possible thrombectomy, possible inferior vena cava temporary filter placement [facility] IV moderate sedation  Date/Time:  8/19/2022 1:40 PM    Written consent obtained?: Yes    Risks and benefits: Risks, benefits and alternatives were discussed    Consent given by:  Patient  Patient states understanding of procedure being performed: Yes    Patient's understanding of procedure matches consent: Yes    Procedure consent matches procedure scheduled: Yes    Expected level of sedation:  Moderate  Appropriately NPO:  Yes  ASA Class:  3  Mallampati  :  Grade 3- soft palate visible, posterior pharyngeal wall not visible  Lungs:  Lungs clear with good breath sounds bilaterally  Heart:  Normal heart sounds with tachycardia  History & Physical reviewed:  History and physical reviewed and no updates needed  Statement of review:  I have reviewed the lab findings, diagnostic data, medications, and the plan for sedation

## 2022-08-19 NOTE — PHARMACY-ADMISSION MEDICATION HISTORY
Pharmacy Medication History  Admission medication history interview status for the 8/18/2022  admission is complete. See EPIC admission navigator for prior to admission medications     Location of Interview: Phone  Medication history sources: Patient and Pharmacy ([facility] in [place] [phone])    Significant changes made to the medication list:  Added the entire list    In the past week, patient estimated taking medication this percent of the time: unknown    Additional medication history information:   ---  Pt was started on these medications when he was discharged from [facility] on 7/18/2022.  Pt ran out of lactulose a few weeks ago but has refills to continue on it.  Pt is also about to run out on the rest of the other medications.    Medication reconciliation completed by provider prior to medication history? No    Time spent in this activity: 15 minutes    Prior to Admission medications    Medication Sig Last Dose Taking? Auth Provider Long Term End Date   furosemide (LASIX) 40 MG tablet Take 40 mg by mouth every morning  Yes Unknown, Entered By History Yes    lactulose (CHRONULAC) 10 GM/15ML solution Take 30 g by mouth 3 times daily few weeks ago - ran out Yes Unknown, Entered By History     metFORMIN (GLUCOPHAGE) 500 MG tablet Take 500 mg by mouth 2 times daily (with meals)  Yes Unknown, Entered By History Yes    spironolactone (ALDACTONE) 100 MG tablet Take 100 mg by mouth every morning  Yes Unknown, Entered By History Yes        The information provided in this note is only as accurate as the sources available at the time of update(s)

## 2022-08-19 NOTE — CONSULTS
Patient is uninsured.     Xarelto/Eliquis/Pradaxa  Upon receipt of RX, Discharge Pharmacy can provide one month free.    Subsequent fills would be:     $494/mo (Xarelto)  $527/mo (Eliquis)  $521/mo (Pradaxa)     Jantoven (warfarin)  $10/mo.     The manufacturers of Xarelto and Eliquis do offer free drug through the mail to income-eligible patients.  Application and info for Eliquis at DocDocpaf.org or by calling 1-629.255.2465, and Xarelto at Curbside.org or by calling 1-157.171.2901.    -LAKSHMI Hodgson, Pharmacy Technician/Liaison, Discharge Pharmacy [phone]

## 2022-08-19 NOTE — PROGRESS NOTES
[facility]    Medicine Progress Note - Hospitalist Service        Date of Admission:  8/18/2022 10:09 PM    Assessment & Plan:   Jareth Limon is a 42 year old male with a past medical history of with a past medical history of hypertension, alcoholic liver cirrhosis with ascites presents to hospital with right lower extremity swelling and pain found to have submassive PE without right heart strain and right lower extremity DVT.     Acute extensive right lower extremity DVT  Acute extensive bilateral pulmonary emboli  -Patient presented to [facility] with right lower extremities pain and swelling found to have extensive DVT of right lower extremity as well as bilateral large PE.  Case was discussed with IR and patient was transferred to [facility] for likely thrombectomy in the morning.    -Continue heparin drip  -Awaiting interventional radiology evaluation today for possible thrombectomy  -Heme-onc consult  -Pharmacy liaison consult for eventual oral anticoagulation  -Echo with no evidence of right heart strain.     Transaminitis  Alcoholic liver cirrhosis with ascites   Hyponatremia  Coagulopathy  Patient with transaminitis, hyponatremia, mildly elevated INR all likely secondary to his alcoholic liver cirrhosis.  The patient has been abstinent of alcohol for the last 6 weeks.  He had a paracentesis performed approximately 1 month ago and reports that his abdomen has been significantly less distended since then.  He does not have a hepatologist yet but is in the process of establishing care.  -Monitor   -Hold prior to admission diuretics given large PE and plan for potential thrombectomy. Resume as clinically indicated.     Diet: NPO for Medical/Clinical Reasons Except for: Meds, Ice Chips     DVT Prophylaxis: Heparin drip.  Fulton Catheter: Not present  Code Status: Full Code     Disposition Plan        Entered: Nathaniel Macario MD 08/19/2022, 10:00 AM        Clinically  "Significant Risk Factors Present on Admission             # Hypoalbuminemia: Albumin = 2.3 g/dL (Ref range: 3.4 - 5.0 g/dL) on admission, will monitor as appropriate   # Coagulation Defect: INR = 1.52 (Ref range: 0.85 - 1.15) and/or PTT = N/A on admission, will monitor for bleeding  # Thrombocytopenia: Plts = 113 10e3/uL (Ref range: 150 - 450 10e3/uL) on admission, will monitor for bleeding           The patient's care was discussed with the Bedside Nurse and Patient.    Nathaniel Macario MD  Hospitalist Service  [facility]  Text Page 7AM-6PM  Securely message with the Vocera Web Console (learn more here)  Text page via [employer] Paging/Directory    ______________________________________________________________________    Interval History   Denies dyspnea at rest.  No chest pain.  Endorses mild lower extremity pain at rest but gets significantly worse with activity.  Still has significant right lower extremity swelling.    Data reviewed today: I reviewed all medications, new labs and imaging results over the last 24 hours. I personally reviewed no images or EKG's today.    Physical Exam   Vital signs:  Temp: 98.4  F (36.9  C) Temp src: Oral BP: 98/59 Pulse: 110   Resp: 22 SpO2: 91 % O2 Device: None (Room air)     Weight: 148.5 kg (327 lb 6.4 oz)  Estimated body mass index is 39.42 kg/m  as calculated from the following:    Height as of 4/20/12: 1.893 m (6' 2.51\").    Weight as of 4/26/12: 141.2 kg (311 lb 4 oz).      Wt Readings from Last 2 Encounters:   08/19/22 148.5 kg (327 lb 6.4 oz)   04/26/12 (!) 141.2 kg (311 lb 4 oz)       Gen: AAOX3, NAD, comfortable  HEENT: Supple neck, moist oral mucosa, no pallor  Resp: CTA B/L, normal WOB  CVS: Tachycardic, no murmur.  Abd/GI: Soft, non-tender. BS- normoactive.   Skin: Warm, dry no rashes  MSK: Asymmetric swelling of the right lower extremity with 1-2+ edema.  Neuro- CN- intact. No focal deficits.        Data   Recent Labs   Lab 08/19/22  0751 " 22  2251   WBC 6.5 7.3   HGB 14.3 15.4   MCV 96 95   * 115*   INR  --  1.52*    133   POTASSIUM 4.2 4.3   CHLORIDE 103 102   CO2 24 26   BUN 7 7   CR 0.61* 0.64*   ANIONGAP 6 5   SHERI 8.2* 8.3*   * 161*   ALBUMIN  --  2.3*   PROTTOTAL  --  5.7*   BILITOTAL  --  3.1*   ALKPHOS  --  100   ALT  --  30   AST  --  47*       Recent Results (from the past 24 hour(s))   US Lower Extremity Venous Duplex Left    Narrative    EXAM: US LOWER EXTREMITY VENOUS DUPLEX LEFT  LOCATION: [facility]  DATE/TIME: 2022 6:10 AM    INDICATION: patient with pe and right lower extremity dvt, noted to have left leg swelling too.  COMPARISON: None.  TECHNIQUE: Venous Duplex ultrasound of the left lower extremity with and without compression, augmentation and duplex. Color flow and spectral Doppler with waveform analysis performed.    FINDINGS: Exam includes the common femoral, femoral, popliteal, and contralateral common femoral veins as well as segmentally visualized deep calf veins and greater saphenous vein.     LEFT: No deep vein thrombosis. No superficial thrombophlebitis. No popliteal cyst.  Nonocclusive DVT noted in the right common femoral vein. Patient has known DVT right leg.      Impression    IMPRESSION:  1.  No deep venous thrombosis in the left lower extremity.  2.  Nonocclusive thrombus right common femoral vein. Remainder of right leg not evaluated.   Echocardiogram Complete   Result Value    LVEF  60-65%    Narrative    [id number]  [id number]  [id number]^BRITNEY^KASI^CHARLENE     [facility]  Echocardiography Laboratory  [address] [place] [address]     Name: NICK HENDERSON  MRN: [id number]  : 1980  Study Date: 2022 08:49 AM  Age: 42 yrs  Gender: Male  Patient Location: [facility]  Reason For Study: Pulmonary Embolism  Ordering Physician: KASI TAN  Referring Physician: Tiago Galeana  Performed By: Treasure  DARLENE Davidson     BSA: 2.7 m2  Height: 74 in  Weight: 327 lb  HR: 111  BP: 143/93 mmHg  ______________________________________________________________________________  Procedure  Complete Echo Adult. Optison (NDC #[id number]) given intravenously. Technically  difficult study.  ______________________________________________________________________________  Interpretation Summary     The rhythm was sinus tachycardia.  The visual ejection fraction is 60-65%.  The right ventricle is normal in size and function.  Right ventricular systolic pressure could not be approximated due to  inadequate tricuspid regurgitation.  Dilated pulmonary arteries. Main PA measures 4 cms.  No significant valve disease.  There is no pericardial effusion.  Technically difficult study.  ______________________________________________________________________________  Left Ventricle  The left ventricle is normal in size. There is concentric remodeling present.  The left ventricular ejection fraction is normal. The visual ejection fraction  is 60-65%. No regional wall motion abnormalities noted.     Right Ventricle  The right ventricle is normal in size and function.     Atria  The left atrium is borderline dilated. Right atrial size is normal.     Mitral Valve  Thickened mitral valve posterior leaflet. There is moderate mitral annular  calcification. There is trace mitral regurgitation. There is no mitral valve  stenosis.     Tricuspid Valve  The tricuspid valve is not well visualized, but is grossly normal. Right  ventricular systolic pressure could not be approximated due to inadequate  tricuspid regurgitation. There is physiologic tricuspid regurgitation.     Aortic Valve  The aortic valve is trileaflet. No aortic regurgitation is present. No aortic  stenosis is present.     Pulmonic Valve  The pulmonic valve is not well visualized.     Vessels  The aortic root is normal size. Normal size ascending aorta. Dilated pulmonary  arteries. Main PA  measures 4 cms.     Pericardium  There is no pericardial effusion.     Rhythm  The rhythm was sinus tachycardia.  ______________________________________________________________________________  MMode/2D Measurements & Calculations  IVSd: 1.4 cm     LVIDd: 4.2 cm  LVIDs: 2.7 cm  LVPWd: 1.2 cm  FS: 36.0 %  LV mass(C)d: 207.2 grams  LV mass(C)dI: 77.4 grams/m2  Ao root diam: 3.5 cm  LA dimension: 5.0 cm  asc Aorta Diam: 3.3 cm  LA/Ao: 1.4  LA Volume (BP): 79.4 ml  LA Volume Index (BP): 29.6 ml/m2  RWT: 0.57     Doppler Measurements & Calculations  PA acc time: 0.06 sec     ______________________________________________________________________________  Report approved by: Gianna Hernandez 08/19/2022 09:56 AM           Medications     heparin 1,800 Units/hr (08/18/22 2322)     - MEDICATION INSTRUCTIONS -       sodium chloride 75 mL/hr at 08/19/22 0108       heparin ANTICOAGULANT Loading dose  4,000 Units Intravenous Once     sodium chloride (PF)  3 mL Intracatheter Q8H

## 2022-08-20 LAB
GLUCOSE BLDC GLUCOMTR-MCNC: 144 MG/DL (ref 70–99)
GLUCOSE BLDC GLUCOMTR-MCNC: 159 MG/DL (ref 70–99)
GLUCOSE BLDC GLUCOMTR-MCNC: 187 MG/DL (ref 70–99)
HBA1C MFR BLD: 7.9 % (ref 0–5.6)
UFH PPP CHRO-ACNC: 0.34 IU/ML
UFH PPP CHRO-ACNC: 0.4 IU/ML

## 2022-08-20 PROCEDURE — 94660 CPAP INITIATION&MGMT: CPT

## 2022-08-20 PROCEDURE — 258N000003 HC RX IP 258 OP 636: Performed by: HOSPITALIST

## 2022-08-20 PROCEDURE — 210N000002 HC R&B HEART CARE

## 2022-08-20 PROCEDURE — 250N000013 HC RX MED GY IP 250 OP 250 PS 637: Performed by: HOSPITALIST

## 2022-08-20 PROCEDURE — 85520 HEPARIN ASSAY: CPT | Performed by: HOSPITALIST

## 2022-08-20 PROCEDURE — 99233 SBSQ HOSP IP/OBS HIGH 50: CPT | Performed by: HOSPITALIST

## 2022-08-20 PROCEDURE — 250N000011 HC RX IP 250 OP 636: Performed by: HOSPITALIST

## 2022-08-20 PROCEDURE — 36415 COLL VENOUS BLD VENIPUNCTURE: CPT | Performed by: INTERNAL MEDICINE

## 2022-08-20 PROCEDURE — 36415 COLL VENOUS BLD VENIPUNCTURE: CPT | Performed by: HOSPITALIST

## 2022-08-20 PROCEDURE — 85520 HEPARIN ASSAY: CPT | Performed by: INTERNAL MEDICINE

## 2022-08-20 PROCEDURE — 999N000157 HC STATISTIC RCP TIME EA 10 MIN

## 2022-08-20 PROCEDURE — 250N000012 HC RX MED GY IP 250 OP 636 PS 637: Performed by: HOSPITALIST

## 2022-08-20 RX ORDER — SPIRONOLACTONE 25 MG/1
100 TABLET ORAL EVERY MORNING
Status: DISCONTINUED | OUTPATIENT
Start: 2022-08-20 | End: 2022-08-23 | Stop reason: HOSPADM

## 2022-08-20 RX ORDER — FUROSEMIDE 40 MG
40 TABLET ORAL EVERY MORNING
Status: DISCONTINUED | OUTPATIENT
Start: 2022-08-20 | End: 2022-08-23 | Stop reason: HOSPADM

## 2022-08-20 RX ORDER — NICOTINE POLACRILEX 4 MG
15-30 LOZENGE BUCCAL
Status: DISCONTINUED | OUTPATIENT
Start: 2022-08-20 | End: 2022-08-23 | Stop reason: HOSPADM

## 2022-08-20 RX ORDER — DEXTROSE MONOHYDRATE 25 G/50ML
25-50 INJECTION, SOLUTION INTRAVENOUS
Status: DISCONTINUED | OUTPATIENT
Start: 2022-08-20 | End: 2022-08-23 | Stop reason: HOSPADM

## 2022-08-20 RX ADMIN — FUROSEMIDE 40 MG: 40 TABLET ORAL at 12:08

## 2022-08-20 RX ADMIN — HEPARIN SODIUM 2250 UNITS/HR: 10000 INJECTION, SOLUTION INTRAVENOUS at 09:33

## 2022-08-20 RX ADMIN — HEPARIN SODIUM 2250 UNITS/HR: 10000 INJECTION, SOLUTION INTRAVENOUS at 22:24

## 2022-08-20 RX ADMIN — INSULIN ASPART 0.5 UNITS: 100 INJECTION, SOLUTION INTRAVENOUS; SUBCUTANEOUS at 18:40

## 2022-08-20 RX ADMIN — SODIUM CHLORIDE: 9 INJECTION, SOLUTION INTRAVENOUS at 03:28

## 2022-08-20 RX ADMIN — SPIRONOLACTONE 100 MG: 25 TABLET ORAL at 12:08

## 2022-08-20 ASSESSMENT — ACTIVITIES OF DAILY LIVING (ADL)
ADLS_ACUITY_SCORE: 28

## 2022-08-20 NOTE — PLAN OF CARE
A&OX4, RA, VSS, denies pain/SOB. On heparin gtt infusing at 2250 units/hr. RLE edema > LLE. Urine nitesh in color, Tele- SR. Up with assist of 1. Right and left groin WNL

## 2022-08-20 NOTE — PROGRESS NOTES
Pt VS stable today, on room air. No C/o pain, denies SOB.  Remains on heparin gtt @ 2250 units/hr, now therapeutic until 8/21 AM when next Xa scheduled.  Bilateral groin sites unchanged, both soft, no signs of hematoma.  RLE remains larger than LLE, pulses easily detectable via doppler on R foot, all unchanged from shift onset.  Plan per Dr Tolliver in Hem-Onc is to transition from heparin to eliquis 8/21 should all remain stable overnight, and likely discharge later to home.

## 2022-08-20 NOTE — PROVIDER NOTIFICATION
MD Notification    Notified Person: MD    Notified Person Name:Dr. Berry    Notification Date/Time:08/20/22 0600    Notification Interaction:text/page    Purpose of Notification:IVF infusing, pt has edema on LE. Stop IVF?  *[id number]    Orders Received:    Comments:

## 2022-08-20 NOTE — PROGRESS NOTES
[facility]    Medicine Progress Note - Hospitalist Service        Date of Admission:  8/18/2022 10:09 PM    Assessment & Plan:   Jareth Limon is a 42 year old male with a past medical history of with a past medical history of hypertension, alcoholic liver cirrhosis with ascites presents to hospital with right lower extremity swelling and pain found to have submassive PE without right heart strain and right lower extremity DVT.     Acute extensive right lower extremity DVT  Acute extensive bilateral pulmonary emboli  Patient presented to [facility] with right lower extremities pain and swelling found to have extensive DVT of right lower extremity as well as bilateral large PE.  s/p bilateral thrombectomy on 8/19 by IR.   Echo with no evidence of right heart strain.  - Continue heparin drip (switch to apixaban when ok with IR/hemonc).  - Appreciate hemonc/IR assistance.  - Long-term AC and thrombophilia w/u  per hemonc (pt is from [employer] and need hematologist close home).       Alcoholic liver cirrhosis with ascites   Elevated junior and INR on admit.  The patient has been abstinent of alcohol for the last 6 weeks.  He had a paracentesis performed approximately 1 month ago and reports that his abdomen has been significantly less distended since then.  He does not have a hepatologist yet but is in the process of establishing care.  PTA diuretics held on admit.  - Low salt diet.  - resume PTA diuretics.  - Monitor LFTs.  - F/U with GI/hepatologist at discharge.    DMII:  On metformin PTA. Unknown last A1C.  - sliding scale insulin for now.  - Diabetic diet.  - A1C if no recent one.     Diet: diabetic.  DVT Prophylaxis: Heparin drip.  Fulton Catheter: Not present  Code Status: Full Code     Disposition Plan     1-2 days.       Entered: Miri Mayer MD 08/20/2022, 10:05 AM            The patient's care was discussed with the Bedside Nurse and Patient.  Also d/w with father and his  "michel.    Miri Mayer MD  Hospitalist Service  [facility]      ______________________________________________________________________    Interval History   No sob/cp. No abd pain/n/v. Oriented x3.     Physical Exam   Vital signs:  Temp: 97.8  F (36.6  C) Temp src: Oral BP: 110/72 Pulse: 107   Resp: 18 SpO2: 94 % O2 Device: None (Room air) Oxygen Delivery: 2 LPM Height: 190.5 cm (6' 3\") Weight: 146.1 kg (322 lb)  Estimated body mass index is 40.25 kg/m  as calculated from the following:    Height as of this encounter: 1.905 m (6' 3\").    Weight as of this encounter: 146.1 kg (322 lb).      Wt Readings from Last 2 Encounters:   08/20/22 146.1 kg (322 lb)   04/26/12 (!) 141.2 kg (311 lb 4 oz)       Gen: AAOX3, NAD, comfortable  HEENT: Supple neck, moist oral mucosa, no pallor  Resp: CTA B/L, normal WOB  CVS: Tachycardic, no murmur.  Abd/GI: Soft, non-tender. BS- normoactive.   Skin: Warm, dry no rashes  MSK: Asymmetric swelling of the right lower extremity with 1-2+ edema.  Neuro- CN- intact. No focal deficits.        Data   Recent Labs   Lab 08/19/22  0751 08/18/22  2251   WBC 6.5 7.3   HGB 14.3 15.4   MCV 96 95   * 115*   INR  --  1.52*    133   POTASSIUM 4.2 4.3   CHLORIDE 103 102   CO2 24 26   BUN 7 7   CR 0.61* 0.64*   ANIONGAP 6 5   SHERI 8.2* 8.3*   * 161*   ALBUMIN  --  2.3*   PROTTOTAL  --  5.7*   BILITOTAL  --  3.1*   ALKPHOS  --  100   ALT  --  30   AST  --  47*       No results found for this or any previous visit (from the past 24 hour(s)).  Medications     heparin 2,250 Units/hr (08/20/22 1714)     - MEDICATION INSTRUCTIONS -         sodium chloride (PF)  3 mL Intracatheter Q8H                 "

## 2022-08-20 NOTE — IR NOTE
RADIOLOGY POST PROCEDURE NOTE    Patient name: Jareth Limon  MRN: [id number]  : 1980    Pre-procedure diagnosis: Bilateral pulmonary emboli with   Post-procedure diagnosis: Same    Procedure Date/Time: 2022  10:01 PM  Procedure: Bilateral venous groin access with placement of 24 Fr sheath on the right and 12 Fr sheath on the left.    Mechanical thrombectomy performed with removal of all thrombus bilaterally.  Great result.    PA pressure pre:  26/10, MAP 19  PA pressure post:  29/15, MAP 12      Estimated blood loss: 200 ml  Specimen(s) collected with description: Bilateral sheaths removed and pursetring sutures placed in each groin.  Sutures may be removed in 2-3 days.    The patient tolerated the procedure well with no immediate complications.  Significant findings:  Please see above.    See imaging dictation for procedural details.    Provider name: Anil Astorga MD  Assistant(s):None

## 2022-08-20 NOTE — IR NOTE
Interventional Radiology Intra-procedural Nursing Note    Patient Name: Jareth Limon  Medical Record Number: [id number]  Today's Date: August 19, 2022    Procedure: Bilateral pulmonary angiogram with possible thrombectomy, possible inferior vena cava temporary filter placement iw IV moderate sedation  Start time: 1945  End time: 2135  Report provided to: CCU  RN  Patient depart time and location: 2145 to 249    Note: Patient entered Interventional Radiology Suite number 2 via cart. Patient awake, alert and orientated. Assisted onto procedural table in supine position. Prepped and draped.  Dr. Astorga in room. Time out and procedure started. Vital signs stable. Telemetry reading ST.    Procedure well tolerated by patient without complications. Procedure end with debrief by Dr. Astorga.  Pressure held until hemostasis achieved. Sutures and quick clot and tegaderm dressing applied to Bilateral groin access sites.    Administered medication totals:  Lidocaine 1% 12 mL Intradermal  Heparin 2250 Unit gtt  Versed 4 mg IVP  Fentanyl 200 mcg IVP    Last dose of sedation administered at 2045.

## 2022-08-20 NOTE — PROGRESS NOTES
MD Notification    Notified Person: MD    Notified Person Name: Dr. Janel Tolliver    Notification Date/Time: Today; 1115    Notification Interaction: Text page    Purpose of Notification:    RE:  Jareth PIZANO [address]... Dr. Mayer is requesting you call the father of pt who is in the room with his son.  Father is Don, # [phone]. Also, any sense of when he may go to PO AC and stop the heparin drip? Thx. Tomer [id number]    Orders Received:    Comments:

## 2022-08-20 NOTE — PROGRESS NOTES
Chart check.    Patient now s/p mechanical thrombectomy with removal of all thrombus bilaterally. Please see Dr. Webb's consult note for recommendations. Vitamin B12 elevated -- no deficiency. Hypercoagulable workup ordered by Dr. Webb -- will arrange for patient to follow-up with her to review results.    Will signoff. Please call if questions.    Janel Tolliver MD  Hematology/Oncology  [facility]

## 2022-08-21 ENCOUNTER — APPOINTMENT (OUTPATIENT)
Dept: ULTRASOUND IMAGING | Facility: CLINIC | Age: 42
DRG: 164 | End: 2022-08-21
Attending: HOSPITALIST

## 2022-08-21 LAB
ALBUMIN SERPL-MCNC: 2.1 G/DL (ref 3.4–5)
ALP SERPL-CCNC: 90 U/L (ref 40–150)
ALT SERPL W P-5'-P-CCNC: 24 U/L (ref 0–70)
ANION GAP SERPL CALCULATED.3IONS-SCNC: 5 MMOL/L (ref 3–14)
AST SERPL W P-5'-P-CCNC: 37 U/L (ref 0–45)
ATRIAL RATE - MUSE: 109 BPM
BILIRUB SERPL-MCNC: 2.3 MG/DL (ref 0.2–1.3)
BUN SERPL-MCNC: 10 MG/DL (ref 7–30)
CALCIUM SERPL-MCNC: 8.2 MG/DL (ref 8.5–10.1)
CHLORIDE BLD-SCNC: 106 MMOL/L (ref 94–109)
CO2 SERPL-SCNC: 25 MMOL/L (ref 20–32)
CREAT SERPL-MCNC: 0.73 MG/DL (ref 0.66–1.25)
DIASTOLIC BLOOD PRESSURE - MUSE: NORMAL MMHG
ERYTHROCYTE [DISTWIDTH] IN BLOOD BY AUTOMATED COUNT: 12.6 % (ref 10–15)
FOLATE SERPL-MCNC: 8.5 NG/ML (ref 4.6–34.8)
GFR SERPL CREATININE-BSD FRML MDRD: >90 ML/MIN/1.73M2
GLUCOSE BLD-MCNC: 143 MG/DL (ref 70–99)
GLUCOSE BLDC GLUCOMTR-MCNC: 124 MG/DL (ref 70–99)
GLUCOSE BLDC GLUCOMTR-MCNC: 151 MG/DL (ref 70–99)
GLUCOSE BLDC GLUCOMTR-MCNC: 166 MG/DL (ref 70–99)
GLUCOSE BLDC GLUCOMTR-MCNC: 179 MG/DL (ref 70–99)
HCT VFR BLD AUTO: 40.4 % (ref 40–53)
HGB BLD-MCNC: 13.7 G/DL (ref 13.3–17.7)
INTERPRETATION ECG - MUSE: NORMAL
MCH RBC QN AUTO: 32.8 PG (ref 26.5–33)
MCHC RBC AUTO-ENTMCNC: 33.9 G/DL (ref 31.5–36.5)
MCV RBC AUTO: 97 FL (ref 78–100)
P AXIS - MUSE: 43 DEGREES
PLATELET # BLD AUTO: 125 10E3/UL (ref 150–450)
POTASSIUM BLD-SCNC: 4 MMOL/L (ref 3.4–5.3)
PR INTERVAL - MUSE: 170 MS
PROT SERPL-MCNC: 5.1 G/DL (ref 6.8–8.8)
QRS DURATION - MUSE: 92 MS
QT - MUSE: 368 MS
QTC - MUSE: 495 MS
R AXIS - MUSE: 65 DEGREES
RBC # BLD AUTO: 4.18 10E6/UL (ref 4.4–5.9)
SODIUM SERPL-SCNC: 136 MMOL/L (ref 133–144)
SYSTOLIC BLOOD PRESSURE - MUSE: NORMAL MMHG
T AXIS - MUSE: 52 DEGREES
UFH PPP CHRO-ACNC: 0.21 IU/ML
VENTRICULAR RATE- MUSE: 109 BPM
WBC # BLD AUTO: 6.5 10E3/UL (ref 4–11)

## 2022-08-21 PROCEDURE — 210N000002 HC R&B HEART CARE

## 2022-08-21 PROCEDURE — 76700 US EXAM ABDOM COMPLETE: CPT

## 2022-08-21 PROCEDURE — 250N000011 HC RX IP 250 OP 636: Performed by: HOSPITALIST

## 2022-08-21 PROCEDURE — 85027 COMPLETE CBC AUTOMATED: CPT | Performed by: HOSPITALIST

## 2022-08-21 PROCEDURE — 85520 HEPARIN ASSAY: CPT | Performed by: HOSPITALIST

## 2022-08-21 PROCEDURE — 36415 COLL VENOUS BLD VENIPUNCTURE: CPT | Performed by: HOSPITALIST

## 2022-08-21 PROCEDURE — 82746 ASSAY OF FOLIC ACID SERUM: CPT | Performed by: HOSPITALIST

## 2022-08-21 PROCEDURE — 250N000013 HC RX MED GY IP 250 OP 250 PS 637: Performed by: HOSPITALIST

## 2022-08-21 PROCEDURE — 80053 COMPREHEN METABOLIC PANEL: CPT | Performed by: HOSPITALIST

## 2022-08-21 PROCEDURE — 99233 SBSQ HOSP IP/OBS HIGH 50: CPT | Performed by: HOSPITALIST

## 2022-08-21 RX ORDER — LACTULOSE 10 G/15ML
30 SOLUTION ORAL 3 TIMES DAILY
Status: DISCONTINUED | OUTPATIENT
Start: 2022-08-21 | End: 2022-08-23 | Stop reason: HOSPADM

## 2022-08-21 RX ADMIN — INSULIN ASPART 0.5 UNITS: 100 INJECTION, SOLUTION INTRAVENOUS; SUBCUTANEOUS at 08:52

## 2022-08-21 RX ADMIN — LACTULOSE 30 G: 20 SOLUTION ORAL at 21:20

## 2022-08-21 RX ADMIN — APIXABAN 10 MG: 5 TABLET, FILM COATED ORAL at 10:39

## 2022-08-21 RX ADMIN — THIAMINE HCL TAB 100 MG 100 MG: 100 TAB at 10:39

## 2022-08-21 RX ADMIN — FUROSEMIDE 40 MG: 40 TABLET ORAL at 10:39

## 2022-08-21 RX ADMIN — APIXABAN 10 MG: 5 TABLET, FILM COATED ORAL at 21:19

## 2022-08-21 RX ADMIN — SPIRONOLACTONE 100 MG: 25 TABLET ORAL at 10:40

## 2022-08-21 RX ADMIN — HEPARIN SODIUM 2400 UNITS/HR: 10000 INJECTION, SOLUTION INTRAVENOUS at 08:52

## 2022-08-21 RX ADMIN — LACTULOSE 30 G: 20 SOLUTION ORAL at 10:39

## 2022-08-21 RX ADMIN — LACTULOSE 30 G: 20 SOLUTION ORAL at 16:35

## 2022-08-21 ASSESSMENT — ACTIVITIES OF DAILY LIVING (ADL)
ADLS_ACUITY_SCORE: 28
DEPENDENT_IADLS:: INDEPENDENT
ADLS_ACUITY_SCORE: 28

## 2022-08-21 NOTE — CONSULTS
Care Management Initial Consult    General Information  Assessment completed with: Patient, Parents,    Type of CM/SW Visit: Initial Assessment    Primary Care Provider verified and updated as needed: Yes   Readmission within the last 30 days:        Reason for Consult: community resources, discharge planning, insurance concerns, substance use concerns  Advance Care Planning: Advance Care Planning Reviewed: no concerns identified          Communication Assessment  Patient's communication style: spoken language (English or Bilingual)    Hearing Difficulty or Deaf: no   Wear Glasses or Blind: no    Cognitive  Cognitive/Neuro/Behavioral: WDL                      Living Environment:   People in home: parent(s)     Current living Arrangements: house      Able to return to prior arrangements: yes       Family/Social Support:  Care provided by: self, parent(s)  Provides care for: no one  Marital Status: Single  Parent(s)          Description of Support System: Supportive, Involved    Support Assessment: Adequate family and caregiver support    Current Resources:   Patient receiving home care services: No     Community Resources: Financial/Insurance  Equipment currently used at home: none  Supplies currently used at home: None    Employment/Financial:  Employment Status: unemployed        Financial Concerns: insurance, none   Referral to Financial Worker: Yes       Lifestyle & Psychosocial Needs:  Social Determinants of Health     Tobacco Use: High Risk     Smoking Tobacco Use: Current Every Day Smoker     Smokeless Tobacco Use: Current User   Alcohol Use: Not on file   Financial Resource Strain: Not on file   Food Insecurity: Not on file   Transportation Needs: Not on file   Physical Activity: Not on file   Stress: Not on file   Social Connections: Not on file   Intimate Partner Violence: Not on file   Depression: Not on file   Housing Stability: Not on file       Functional Status:  Prior to admission patient needed  assistance:   Dependent ADLs:: Independent  Dependent IADLs:: Independent       Mental Health Status:  Mental Health Status: Current Concern       Chemical Dependency Status:  Chemical Dependency Status: Current Concern  Chemical Dependency Management: Other (see comment) (Rule 25 assessment Mon 8/22)          Values/Beliefs:  Spiritual, Cultural Beliefs, Uatsdin Practices, Values that affect care: no               Additional Information:  Jareth was admitted to [facility] from [facility] for RLE DVT and extensive bilateral PE. Writer met with patient at the bedside. Introduced self and role. He is A&Ox4 and agreeable to discuss discharge plans. Jareth's father and step-mom were also present for assessment. Jareth currently lives with his dad and step-mom in [place], [place] (near [place], [place]). He does not have insurance. His step-mom reports that Jareth has completed the [employer] application. He has established care with [facility].   Writer sent email to financial counseling.   Jareth will meet with CD tomorrow for a Rule 25 assessment. He says he is agreeable to inpatient treatment. Writer deferred family's questions to the  who meets with them tomorrow, as they will provide the most accurate information.  Hospitalist indicated the CC should assist with obtaining a new CPAP for patient. Unfortunately, there are no med records of Jareth's past sleep study and he does not remember what DME company supplied his last CPAP. Will update MD and Jareth may have to complete new study or qualify for nocturnal O2. Unsure how this works with insurance.  CC team will continue to follow for any additional discharge needs or resources. Encouraged family to reach out with questions.    Valarie Camacho RN

## 2022-08-21 NOTE — DISCHARGE INSTRUCTIONS
The [facility] website has a lot of information about [facility] and a link to the [employer] application.    [email or web address]    Please contact the DME company that supplied your original CPAP to order replacement parts or a new machine.     Complete the [employer] application here: [email or web address]

## 2022-08-21 NOTE — PLAN OF CARE
A&OX4, used BIPAP overnight,  VSS, denies pain/SOB. On heparin gtt infusing at 2250 units/hr. RLE edema > LLE. Urine nitehs in color, Tele- SR. Up with assist of SBA. /166

## 2022-08-21 NOTE — PROGRESS NOTES
[facility]    Medicine Progress Note - Hospitalist Service        Date of Admission:  8/18/2022 10:09 PM    Assessment & Plan:   Jareth Limon is a 42 year old male with a past medical history of with a past medical history of hypertension, alcoholic liver cirrhosis with ascites presents to hospital with right lower extremity swelling and pain found to have submassive PE without right heart strain and right lower extremity DVT.     Acute extensive right lower extremity DVT  Acute extensive bilateral pulmonary emboli  Patient presented to [facility] with right lower extremities pain and swelling found to have extensive DVT of right lower extremity as well as bilateral large PE.  s/p bilateral thrombectomy on 8/19 by IR.   Echo with no evidence of right heart strain.  Has been on hep drip.  asymptomatic.  - Switch to apxiban today (consult pharmacy renae regarding coverage).  - Appreciate hemonc/IR assistance.  - Long-term AC and thrombophilia w/u  per hemonc (pt is from [place] and need hematologist close home).       Alcoholic liver cirrhosis with ascites   Alcohol use disorder  Elevated junior and INR on admit.  He had a paracentesis performed approximately 1 month ago and reports that his abdomen has been significantly less distended since then.  Possible ascites (difficult to assess due to the patient's body habitus.  - Low salt diet.  - resumed PTA diuretics.  - Monitor LFTs.  - Folate/thiamine/MVI.  - CD/psych consult (family/pt).  - F/U with GI/hepatologist at discharge.    DMII:  On metformin PTA. Unknown last PTA A1C. Currently 7.9.  - sliding scale insulin for now.  - Diabetic diet.  - F/U with PCO.    DAMIAN: had not used his CPAP machine for quite sometiime as it broke.  -  CPAP hewre.  - CCC to assist arrange a new one at discharge.     Diet: diabetic.  DVT Prophylaxis: Heparin drip.  [place] Catheter: Not present  Code Status: Full Code     Disposition Plan    Likely tomorrow  "after seeing addiction medicine/psych.  New CPAP at discharge.  Entered: Miri Mayer MD 08/21/2022, 9:45 AM            The patient's care was discussed with the Bedside Nurse and Patient.      Miri Mayer MD  Hospitalist Service  [facility]      ______________________________________________________________________    Interval History   No sob/cp. No abd pain/n/v. Oriented x3.      Physical Exam   Vital signs:  Temp: 97.4  F (36.3  C) Temp src: Oral BP: 109/62 Pulse: 103   Resp: 16 SpO2: 95 % O2 Device: Nasal cannula Oxygen Delivery: 2 LPM Height: 190.5 cm (6' 3\") Weight: 148.8 kg (328 lb)  Estimated body mass index is 41 kg/m  as calculated from the following:    Height as of this encounter: 1.905 m (6' 3\").    Weight as of this encounter: 148.8 kg (328 lb).      Wt Readings from Last 2 Encounters:   08/21/22 148.8 kg (328 lb)   04/26/12 (!) 141.2 kg (311 lb 4 oz)       Gen: AAOX3, NAD, comfortable  HEENT: Supple neck, moist oral mucosa, no pallor  Resp: CTA B/L, normal WOB  CVS: Tachycardic, no murmur.  Abd/GI:Probable ascites, obese, non tense. nt.  Skin: Warm, dry no rashes  MSK: Asymmetric swelling of the right lower extremity with 1-2+ edema.  Neuro- CN- intact. No focal deficits.        Data   Recent Labs   Lab 08/21/22  0736 08/21/22  0559 08/21/22  0205 08/20/22  1203 08/19/22  0751 08/18/22  2251   WBC  --  6.5  --   --  6.5 7.3   HGB  --  13.7  --   --  14.3 15.4   MCV  --  97  --   --  96 95   PLT  --  125*  --   --  113* 115*   INR  --   --   --   --   --  1.52*   NA  --  136  --   --  133 133   POTASSIUM  --  4.0  --   --  4.2 4.3   CHLORIDE  --  106  --   --  103 102   CO2  --  25  --   --  24 26   BUN  --  10  --   --  7 7   CR  --  0.73  --   --  0.61* 0.64*   ANIONGAP  --  5  --   --  6 5   SHERI  --  8.2*  --   --  8.2* 8.3*   * 143* 166*   < > 138* 161*   ALBUMIN  --  2.1*  --   --   --  2.3*   PROTTOTAL  --  5.1*  --   --   --  5.7*   BILITOTAL  --  2.3*  --   " --   --  3.1*   ALKPHOS  --  90  --   --   --  100   ALT  --  24  --   --   --  30   AST  --  37  --   --   --  47*    < > = values in this interval not displayed.       No results found for this or any previous visit (from the past 24 hour(s)).  Medications     Injection Device for insulin       - MEDICATION INSTRUCTIONS -         apixaban ANTICOAGULANT  10 mg Oral BID    Followed by     [START ON 8/28/2022] apixaban ANTICOAGULANT  5 mg Oral BID     furosemide  40 mg Oral QAM     insulin aspart  0.5-2.5 Units Subcutaneous TID AC     insulin aspart  0.5-2.5 Units Subcutaneous At Bedtime     sodium chloride (PF)  3 mL Intracatheter Q8H     spironolactone  100 mg Oral QAM

## 2022-08-21 NOTE — PROGRESS NOTES
Interventional Radiology - Progress Note  Inpatient - [facility]   08/21/2022     IR Brief Note    Pt with RLE DVT and bilateral PE who underwent mechanical thrombectomy on 8/19 by IR. Pt is currently on heparin drip. Hematology is following. Planing on switching to apixaban.  Pt denies any CP or SOB. Significant RLE swelling is noted on exam. Purse string sutures have been removed from both groins w/o any complications. Pressure dressings have been applies.        Grant Tubbs PA-C  Interventional Radiology  [phone]

## 2022-08-22 LAB
B2 GLYCOPROT1 IGG SERPL IA-ACNC: 1.3 U/ML
B2 GLYCOPROT1 IGM SERPL IA-ACNC: 4 U/ML
CARDIOLIPIN IGG SER IA-ACNC: <2 GPL-U/ML
CARDIOLIPIN IGG SER IA-ACNC: NEGATIVE
CARDIOLIPIN IGM SER IA-ACNC: 49 MPL-U/ML
CARDIOLIPIN IGM SER IA-ACNC: POSITIVE
GLUCOSE BLDC GLUCOMTR-MCNC: 146 MG/DL (ref 70–99)
GLUCOSE BLDC GLUCOMTR-MCNC: 156 MG/DL (ref 70–99)
GLUCOSE BLDC GLUCOMTR-MCNC: 176 MG/DL (ref 70–99)
GLUCOSE BLDC GLUCOMTR-MCNC: 177 MG/DL (ref 70–99)
INR PPP: 1.66 (ref 0.85–1.15)
SARS-COV-2 RNA RESP QL NAA+PROBE: NEGATIVE

## 2022-08-22 PROCEDURE — 99233 SBSQ HOSP IP/OBS HIGH 50: CPT | Performed by: INTERNAL MEDICINE

## 2022-08-22 PROCEDURE — 36415 COLL VENOUS BLD VENIPUNCTURE: CPT | Performed by: INTERNAL MEDICINE

## 2022-08-22 PROCEDURE — 02CQ3ZZ EXTIRPATION OF MATTER FROM RIGHT PULMONARY ARTERY, PERCUTANEOUS APPROACH: ICD-10-PCS | Performed by: RADIOLOGY

## 2022-08-22 PROCEDURE — 999N000127 HC STATISTIC PERIPHERAL IV START W US GUIDANCE

## 2022-08-22 PROCEDURE — B5191ZZ FLUOROSCOPY OF INFERIOR VENA CAVA USING LOW OSMOLAR CONTRAST: ICD-10-PCS | Performed by: RADIOLOGY

## 2022-08-22 PROCEDURE — 250N000011 HC RX IP 250 OP 636: Performed by: INTERNAL MEDICINE

## 2022-08-22 PROCEDURE — 99232 SBSQ HOSP IP/OBS MODERATE 35: CPT | Performed by: INTERNAL MEDICINE

## 2022-08-22 PROCEDURE — 250N000013 HC RX MED GY IP 250 OP 250 PS 637: Performed by: HOSPITALIST

## 2022-08-22 PROCEDURE — 85610 PROTHROMBIN TIME: CPT | Performed by: INTERNAL MEDICINE

## 2022-08-22 PROCEDURE — U0005 INFEC AGEN DETEC AMPLI PROBE: HCPCS | Performed by: INTERNAL MEDICINE

## 2022-08-22 PROCEDURE — 250N000013 HC RX MED GY IP 250 OP 250 PS 637: Performed by: INTERNAL MEDICINE

## 2022-08-22 PROCEDURE — H0001 ALCOHOL AND/OR DRUG ASSESS: HCPCS

## 2022-08-22 PROCEDURE — 06C03ZZ EXTIRPATION OF MATTER FROM INFERIOR VENA CAVA, PERCUTANEOUS APPROACH: ICD-10-PCS | Performed by: RADIOLOGY

## 2022-08-22 PROCEDURE — 250N000012 HC RX MED GY IP 250 OP 636 PS 637: Performed by: INTERNAL MEDICINE

## 2022-08-22 PROCEDURE — 120N000001 HC R&B MED SURG/OB

## 2022-08-22 PROCEDURE — 06H03DZ INSERTION OF INTRALUMINAL DEVICE INTO INFERIOR VENA CAVA, PERCUTANEOUS APPROACH: ICD-10-PCS | Performed by: RADIOLOGY

## 2022-08-22 RX ORDER — WARFARIN SODIUM 10 MG/1
10 TABLET ORAL
Status: COMPLETED | OUTPATIENT
Start: 2022-08-22 | End: 2022-08-22

## 2022-08-22 RX ORDER — ENOXAPARIN SODIUM 150 MG/ML
0.75 INJECTION SUBCUTANEOUS EVERY 12 HOURS
Status: DISCONTINUED | OUTPATIENT
Start: 2022-08-22 | End: 2022-08-23 | Stop reason: HOSPADM

## 2022-08-22 RX ADMIN — INSULIN ASPART 0.5 UNITS: 100 INJECTION, SOLUTION INTRAVENOUS; SUBCUTANEOUS at 14:23

## 2022-08-22 RX ADMIN — SPIRONOLACTONE 100 MG: 25 TABLET ORAL at 09:42

## 2022-08-22 RX ADMIN — APIXABAN 10 MG: 5 TABLET, FILM COATED ORAL at 09:42

## 2022-08-22 RX ADMIN — INSULIN ASPART 0.5 UNITS: 100 INJECTION, SOLUTION INTRAVENOUS; SUBCUTANEOUS at 09:47

## 2022-08-22 RX ADMIN — FUROSEMIDE 40 MG: 40 TABLET ORAL at 09:42

## 2022-08-22 RX ADMIN — LACTULOSE 30 G: 20 SOLUTION ORAL at 16:02

## 2022-08-22 RX ADMIN — INSULIN ASPART 0.5 UNITS: 100 INJECTION, SOLUTION INTRAVENOUS; SUBCUTANEOUS at 18:57

## 2022-08-22 RX ADMIN — WARFARIN SODIUM 10 MG: 10 TABLET ORAL at 18:57

## 2022-08-22 RX ADMIN — ENOXAPARIN SODIUM 105 MG: 120 INJECTION SUBCUTANEOUS at 22:41

## 2022-08-22 RX ADMIN — LACTULOSE 30 G: 20 SOLUTION ORAL at 09:42

## 2022-08-22 RX ADMIN — THIAMINE HCL TAB 100 MG 100 MG: 100 TAB at 09:42

## 2022-08-22 RX ADMIN — INSULIN GLARGINE 5 UNITS: 100 INJECTION, SOLUTION SUBCUTANEOUS at 14:23

## 2022-08-22 ASSESSMENT — ACTIVITIES OF DAILY LIVING (ADL)
FALL_HISTORY_WITHIN_LAST_SIX_MONTHS: NO
CHANGE_IN_FUNCTIONAL_STATUS_SINCE_ONSET_OF_CURRENT_ILLNESS/INJURY: NO
WALKING_OR_CLIMBING_STAIRS_DIFFICULTY: NO
ADLS_ACUITY_SCORE: 28
TOILETING_ISSUES: NO
ADLS_ACUITY_SCORE: 28
DIFFICULTY_EATING/SWALLOWING: NO
ADLS_ACUITY_SCORE: 28
ADLS_ACUITY_SCORE: 20
DRESSING/BATHING_DIFFICULTY: NO
DIFFICULTY_COMMUNICATING: NO
WEAR_GLASSES_OR_BLIND: NO
ADLS_ACUITY_SCORE: 28
ADLS_ACUITY_SCORE: 28
CONCENTRATING,_REMEMBERING_OR_MAKING_DECISIONS_DIFFICULTY: NO
ADLS_ACUITY_SCORE: 20
ADLS_ACUITY_SCORE: 28
ADLS_ACUITY_SCORE: 28
ADLS_ACUITY_SCORE: 20
ADLS_ACUITY_SCORE: 28
ADLS_ACUITY_SCORE: 20
DOING_ERRANDS_INDEPENDENTLY_DIFFICULTY: NO

## 2022-08-22 NOTE — PROGRESS NOTES
2022      Type Of Assessment: Inpatient Substance Use Comprehensive Assessment    Referral Source:  [facility] 66 [phone]  MRN: [id number]    DATE OF SERVICE: 2022  Date of previous THANH Assessment: NA  Patient confirmed identity through two factor verification: Full Legal Name and     PATIENT'S NAME: Jareth Limon  Age: 42 year old  Last 4 SSN: [id number]  Sex: male   Gender Identity: male  Sexual Orientation: Heterosexual  Cultural Background: No, Denies any cultural influences or concerns that need to be considered for treatment  YOB: 1980  Current Address:   [address] [address]  Patient Phone Number:  [phone]   Patient's E-Mail Contact:  [email or web address]  Funding: NA  PMI: NA  Emergency Contact: Father Feliberto Limon [phone]  DAANES information was provided to patient and patient does not want a copy.     Telemedicine Visit: The patient's condition can be safely assessed and treated via synchronous audio and visual telemedicine encounter.    Reason for Telemedicine Visit: Services only offered telehealth  Originating Site (Patient Location): [address] [address]   Distant Site (Provider Location): Provider Remote Setting- Home Office  Consent:  The patient/guardian has verbally consented to: the potential risks and benefits of telemedicine (video visit) versus in person care; bill my insurance or make self-payment for services provided; and responsibility for payment of non-covered services.   Mode of Communication:  Video Conference via polycom    START TIME: 115 PM  END TIME: 142 PM    As the provider I attest to compliance with applicable laws and regulations related to telemedicine.   Jareth Limon was seen for a substance use disorder consult on 2022 by CAT Burgos.    Reason for Substance Use Disorder Consult:  Per H&P      Chief Complaint      Lower extremity pain and swelling     History obtained from  the patient       History of Present Illness     Jareth Limon is a 42 year old male with a past medical history of with a past medical history of hypertension, alcoholic liver cirrhosis with ascites presents to hospital with right lower extremity swelling and pain found to have submassive PE without right heart strain and right lower extremity DVT.  The patient presented to [facility] on August 18 complaining of right lower extremity pain and swelling.  He reports that swelling and pain have been ongoing for at least 4 days.  Pain is worse when he standing or the leg is lower than his heart and improved with leg elevation.  At [facility] he was found to have a pulmonary embolism as well as a massive right lower extremity DVT and thrombectomy was recommended.  Case was discussed with IR and the patient was transferred to [facility] for thrombectomy in the morning.  On arrival the patient denies any acute complaints.  He reports that he has noted shortness of breath for the last 2 weeks.  He denies any chest pain or palpitations.  He has never had blood clots in the past.  He reports that he has been abstinent of alcohol for the last 6 weeks.    Are you currently having severe withdrawal symptoms that are putting yourself or others in danger? No  Are you currently having severe medical problems that require immediate attention? No  Are you currently having severe emotional or behavioral problems that are putting yourself or others at risk of harm? No    Have you participated in prior substance use disorder evaluations? Yes. When, Where, and What circumstances: [facility]   Have you ever been to detox, inpatient or outpatient treatment for substance related use? List previous treatment: Yes. When, Where, and What circumstances: [facility] 12-13 years ago, couple OP   Have you ever had a gambling problem or had treatment for compulsive gambling? No  Patient does not appear to  be in severe withdrawal, an imminent safety risk to self or others, or requiring immediate medical attention and may proceed with the assessment interview.    Comprehensive Substance Use History   X X = Primary Drug Used Age of First Use    Pattern of Substance Use   (heaviest use in life and a use history within the past year if applicable) (DSM-5: Sx #3) Date /  Quantity of last use if within the past 30 days Withdrawal Potential?   Method of use  (Oral, smoked, snorted, IV, etc)   X Alcohol   15 Teens-parties with friends  20's-increased weekends and couple nights after work  HU-daily for years, amount varies  CU-1/5-1 L Vodka daily for the last 6 months Last use 6 weeks ago  Oral    Marijuana/Hashish   Teens Teens to 20's regular us  No use in years       Cocaine/Crack 20's 20- occasional  Early 40's-On weekends for a few months    Snort    Meth/Amphetamines   No use        Heroin   No use        Other Opiates/Synthetics   Unsp Tried in lifetime       Inhalants  No use        Benzodiazepines   Unsp  In hospital as administered      Hallucinogens   No use        Barbiturates/Sedatives/Hypnotics   No use        Over-the-Counter Drugs   No use        Other   No use        Nicotine   Teens HU- cigarettes, PPD PTA  Smoke     Withdrawal symptoms: Have you had any of the following withdrawal symptoms?  Sweating (Rapid Pulse)  Shaky / Jittery / Tremors  Vivid / Unpleasant Dreams  Nausea / Vomiting  Hallucinations    Have you experienced any cravings?  No    Have you had periods of abstinence?  Yes   What was your longest period?  Pt reports 30- 40 days.    Any circumstances that lead to relapse?   Pt reports no reason, just thought he could have one.    What activities have you engaged in when using alcohol/other drugs that could be hazardous to you or others?  The patient reported having a history of driving while under the influence of alcohol or drugs.    A description of any risk-taking behavior, including behavior  that puts the client at risk of exposure to blood-borne or sexually transmitted diseases: NA    Arrests and legal interventions related to substance use: 1 DUI, no legal issues or history of assault    A description of how the patient's use affected their ability to function appropriately in a work setting:   Pt reports he has had issues with work-calling in sick, being disciplined, and fired.     A description of how the patient's use affected their ability to function appropriately in an educational setting: None reported    Leisure time activities that are associated with substance use: None identified    Do you think your substance use has become a problem for you? He agrees he has a substance abuse problem.    MEDICAL HISTORY  Physical or medical concerns or diagnoses: Per H&P  Acute right dvt  Submassive pe  transaminiitis  Alcoholic liver cirrhosis with ascites   hyponatremia  Coagulopathy  Hypertension  Past Medical History:   Diagnosis Date     Alcoholic cirrhosis of liver with ascites (H)      Diabetes mellitus, type 2 (H)      HTN (hypertension)      DAMIAN (obstructive sleep apnea)     uses cpap     Do you have any current medical treatment needs not being addressed by inpatient treatment?  Pt is currently hospitalized.     Do you need a referral for a medical provider?   Pt reports he is able to see a provider in [place] but would like to establish care in the [facility].     Current medications: Per EHR    Current Facility-Administered Medications   Medication     acetaminophen (TYLENOL) tablet 325 mg     apixaban ANTICOAGULANT (ELIQUIS) tablet 10 mg    Followed by     [START ON 8/28/2022] apixaban ANTICOAGULANT (ELIQUIS) tablet 5 mg     glucose gel 15-30 g    Or     dextrose 50 % injection 25-50 mL    Or     glucagon injection 1 mg     furosemide (LASIX) tablet 40 mg     HYDROmorphone (DILAUDID) injection 0.2 mg     Injection Device for insulin (NOVOPEN ECHO) 1 each     insulin aspart (NovoPen  ECHO/NovoLOG) cartridge     insulin aspart (NovoPen ECHO/NovoLOG) cartridge     insulin glargine (LANTUS PEN) injection 5 Units     lactulose (CHRONULAC) solution 30 g     lidocaine (LMX4) cream     lidocaine 1 % 0.1-1 mL     melatonin tablet 1 mg     naloxone (NARCAN) injection 0.2 mg    Or     naloxone (NARCAN) injection 0.4 mg    Or     naloxone (NARCAN) injection 0.2 mg    Or     naloxone (NARCAN) injection 0.4 mg     ondansetron (ZOFRAN ODT) ODT tab 4 mg    Or     ondansetron (ZOFRAN) injection 4 mg     Patient is already receiving anticoagulation with heparin, enoxaparin (LOVENOX), warfarin (COUMADIN)  or other anticoagulant medication     senna-docusate (SENOKOT-S/PERICOLACE) 8.6-50 MG per tablet 1 tablet    Or     senna-docusate (SENOKOT-S/PERICOLACE) 8.6-50 MG per tablet 2 tablet     sodium chloride (PF) 0.9% PF flush 3 mL     sodium chloride (PF) 0.9% PF flush 3 mL     spironolactone (ALDACTONE) tablet 100 mg     thiamine (B-1) tablet 100 mg         Are you pregnant? NA, Male    Do you have any specific physical needs/accommodations? No     MENTAL HEALTH HISTORY:  Have you ever had  hospitalizations or treatment for mental health illness: No     Mental health history, including diagnosis and symptoms, and the effect on the client's ability to function: Pt reports depression.    Current mental health treatment including psychotropic medication needed to maintain stability: (Note: The assessment must utilize screening tools approved by the commissioner pursuant to section 245.4863 to identify whether the client screens positive for co-occurring disorders): Pt reports no MH med's but has seen a therapist years ago.     GAIN-SS Tool:  When was the last time that you had significant problems... 8/22/2022   with feeling very trapped, lonely, sad, blue, depressed or hopeless about the future? Past month   with sleep trouble, such as bad dreams, sleeping restlessly, or falling asleep during the day? Past Month    with feeling very anxious, nervous, tense, scared, panicked or like something bad was going to happen? Never   with becoming very distressed & upset when something reminded you of the past? Never   with thinking about ending your life or committing suicide? Never     When was the last time that you did the following things 2 or more times? 8/22/2022   Lied or conned to get things you wanted or to avoid having to do something? Never   Had a hard time paying attention at school, work or home? Past month   Had a hard time listening to instructions at school, work or home? Past month   Were a bully or threatened other people? Never   Started physical fights with other people? Never       Have you ever been verbally, emotionally, physically or sexually abused?   The patient denied having any history of being verbally, emotionally, physically or sexually abused.     Family history of substance use and misuse:  Pt reports sisters-ETOH, now sober/ grandparents on both sides.    The patient's desire for family involvement in the treatment program:   Pt reports his family is supportive.  Level of family support: All    Social network in relation to expected support for recovery:   Pt reports he has gone to meetings and has sober friends.     Are you currently in a significant relationship? No    Do you have any children (include living arrangements/custody/contact)?:  No    What is your current living situation?  Pt reports he's living with his father.    Are you employed/attending school? Pt reports he's unemployed.     SUMMARY:  Ability to understand written treatment materials: Yes  Ability to understand patient rules and patient rights: Yes  Does the patient recognize needs related to substance use and is willing to follow treatment recommendations: Yes  Does the patient have an opioid use disorder:  does not have a history of opiate use.    ASAM Dimension Scale Ratings:  Dimension 1: 0 Client displays full functioning  with good ability to tolerate and cope with withdrawal discomfort. No signs or symptoms of intoxication or withdrawal or resolving signs or symptoms.  Dimension 2: 1 Client tolerates and keren with physical discomfort and is able to get the services that the client needs.  Dimension 3: 2 Client has difficulty with impulse control and lacks coping skills. Client has thoughts of suicide or harm to others without means; however, the thoughts may interfere with participation in some treatment activities. Client has difficulty functioning in significant life areas. Client has moderate symptoms of emotional, behavioral, or cognitive problems. Client is able to participate in most treatment activities.  Dimension 4: 1 Client is motivated with active reinforcement, to explore treatment and strategies for change, but ambivalent about illness or need for change.  Dimension 5: 4 No awareness of the negative impact of mental health problems or substance abuse. No coping skills to arrest mental health or addiction illnesses, or prevent relapse.  Dimension 6: 3 Client is not engaged in structured, meaningful activity and the client's peers, family, significant other, and living environment are unsupportive, or there is significant criminal justice system involvement.    Category of Substance Severity (ICD-10 Code / DSM 5 Code)     Alcohol Use Disorder Severe  (10.20) (303.90)   Cannabis Use Disorder The patient does not currently meet the criteria for an Cannabis use disorder, but has a history of regular use.   Hallucinogen Use Disorder The patient does not meet the criteria for a Hallucinogen use disorder.   Inhalant Use Disorder The patient does not meet the criteria for an Inhalant use disorder.   Opioid Use Disorder The patient does not meet the criteria for an Opioid use disorder.   Sedative, Hypnotic, or Anxiolytic Use Disorder The patient does not meet the criteria for a Sedative/Hypnotic use disorder.   Stimulant Related  Disorder The patient does not meet the criteria for a Stimulant use disorder.   Tobacco Use Disorder Moderate   (F17.200) (305.1)   Other (or unknown) Substance Use Disorder The patient does not meet the criteria for a Other (or unknown) Substance use disorder.     A problematic pattern of alcohol/drug use leading to clinically significant impairment or distress, as manifested by at least two of the following, occurring within a 12-month period:    1.) Alcohol/drug is often taken in larger amounts or over a longer period than was intended.  2.) There is a persistent desire or unsuccessful efforts to cut down or control alcohol/drug use  3.) A great deal of time is spent in activities necessary to obtain alcohol, use alcohol, or recover from its effects.  5.) Recurrent alcohol/drug use resulting in a failure to fulfill major role obligations at work, school or home.  7.) Important social, occupational, or recreational activities are given up or reduced because of alcohol/drug use.  8.) Recurrent alcohol/drug use in situations in which it is physically hazardous.  9.) Alcohol/drug use is continued despite knowledge of having a persistent or recurrent physical or psychological problem that is likely to have been caused or exacerbated by alcohol.  10.) Tolerance, as defined by either of the following: A need for markedly increased amounts of alcohol/drug to achieve intoxication or desired effect.  11.) Withdrawal, as manifested by either of the following: The characteristic withdrawal syndrome for alcohol/drug (refer to Criteria A and B of the criteria set for alcohol/drug withdrawal).    Specify if: In early remission:  After full criteria for alcohol/drug use disorder were previously met, none of the criteria for alcohol/drug use disorder have been met for at least 3 months but for less than 12 months (with the exception that Criterion A4,  Craving or a strong desire or urge to use alcohol/drug  may be met).     In  sustained remission:   After full criteria for alcohol use disorder were previously met, non of the criteria for alcohol/drug use disorder have been met at any time during a period of 12 months or longer (with the exception that Criterion A4,  Craving or strong desire or urge to use alcohol/drug  may be met).     Specify if:   This additional specifier is used if the individual is in an environment where access to alcohol is restricted.    Mild: Presence of 2-3 symptoms  Moderate: Presence of 4-5 symptoms  Severe: Presence of 6 or more symptoms    Collateral information: THANH Collateral Info: Sufficient information is obtained from the patient to support diagnosis and recommendations. Contact with a collateral sources is not required. Patient's EHR has been reviewed.     Recommendations:     1. Abstain from all non-prescribed mood-altering substances  2. Take all medications as prescribed  3. Enter and complete a residential or inpatient treatment program  4. Increase sober support, attend support meetings at least one time weekly        5.   Follow all recommendations of your medical providers      Clinical Substantiation:      Pt meets criteria for Alcohol use disorder-severe. Pt reports withdrawal and an inability to control his use. Pt reports he has been to CD treatment in the past. Pt reports he wants to be sober. Pt reports he would like to attend inpatient CD treatment.     Referrals/ Alternatives:     [facility] [phone]  Fax - [phone]      THANH consult completed by: CAT Burgos  Phone Number: [phone]  E-mail Address: [email or web address] Mental Health and Addiction Services Evaluation Department  [address] [place] [address]     *Due to regulation of Title 42 of the Code of Federal Regulations (CFR) Part 2: Confidentiality laws apply to this note and the information wherein.  Thus, this note cannot be copy  and pasted into any other health care staff's note nor can it be included in general medical records sent to ANY outside agency without the patient's written consent.

## 2022-08-22 NOTE — PLAN OF CARE
Problem: Plan of Care - These are the overarching goals to be used throughout the patient stay.    Goal: Plan of Care Review/Shift Note  Description: The Plan of Care Review/Shift note should be completed every shift.  The Outcome Evaluation is a brief statement about your assessment that the patient is improving, declining, or no change.  This information will be displayed automatically on your shift note.  Outcome: Ongoing, Progressing     Problem: VTE (Venous Thromboembolism)  Goal: VTE (Venous Thromboembolism) Symptom Resolution  Outcome: Ongoing, Progressing   Goal Outcome Evaluation:      Awaiting GI consult at this time to help determine blood thinners at discharge.  Patient alert x 4, denies pain.  Up independently in room, and patients Dad here visiting at bedside.

## 2022-08-22 NOTE — PLAN OF CARE
A&OX4, tired, sleeping most of the evening and throughout the night, used BIPAP overnight,  VSS, denies pain/SOB. RLE edema > LLE. Minimal urine output, dark nitesh, Tele- SR/ST, up with assist of SBA. . 2 BM overnight.

## 2022-08-22 NOTE — PLAN OF CARE
Discussed with Dr. Cordero, who recommended we switch to dabigatran in the context of liver dysfunction.   Pharmacy consulted to make this conversion.

## 2022-08-22 NOTE — CONSULTS
Patient is currently uninsured and in need of insulin therapy.       Cheapest available options:     Rapid Acting  Humalog pens or vials:  $35 per 1 month supply (with copay card).       Short Acting  Novolin R vials: $25/vial (with copay card).     Intermediate  Novolin N or Novolin 70/30 vials: $25/vial (with copay card.  Novolin 70/30 pens: [facility] carries an exclusive Novolin (NPH) 70/30 pen product that is $43 for 5 pens.       Basal  Basaglar pens or vials: $35 per 1 month supply (with copay card).      -LAKSHMI Hodgson, Pharmacy Technician/Liaison, Discharge Pharmacy, [phone]

## 2022-08-22 NOTE — CONSULTS
8/22/2022    Pt has been interviewed via [employer] and CD Consult has been completed. Pt reports he has contacted [facility] and they would like an evaluation sent to them. Signed PONCHO has been received from unit SW. Referral has been sent. Pt will need to follow up with activating his insurance.     Recommendations:      1. Abstain from all non-prescribed mood-altering substances  2. Take all medications as prescribed  3. Enter and complete a residential or inpatient treatment program  4. Increase sober support, attend support meetings at least one time weekly        5.   Follow all recommendations of your medical providers        Clinical Substantiation:       Pt meets criteria for Alcohol use disorder-severe. Pt reports withdrawal and an inability to control his use. Pt reports he has been to CD treatment in the past. Pt reports he wants to be sober. Pt reports he would like to attend inpatient CD treatment.      Referrals/ Alternatives:      [facility], [facility] - [phone]  Fax - [phone]        THANH consult completed by: Casandra Buckley [facility]  Phone Number: [phone]  E-mail Address: [email or web address] Mental Health and Addiction Services Evaluation Department  [address] [place] [id number]

## 2022-08-22 NOTE — CONSULTS
GASTROENTEROLOGY CONSULTATION      Jareth Limon  PO   [facility] [address]  42 year old male     Admission Date/Time: 8/18/2022  Primary Care Provider: Mariano Dallas     We were asked to see the patient in consultation by Dr. Márquez  for anticoagulation recommendations for patient with Cirrhosis.    CC: Cirrhosis with recent DVT/PE      HPI:  Jareth Limon is a 42 year old male with a history of alcoholic cirrhosis, Type II DM, DAMIAN, HTN who initially presented to [facility] with Right Lower Extremity swelling and pain and found to have submassive PE without right heart strain and right lower extremity DVT.     Patient underwent thrombectomy with IR on 08/19/22 with complete removal of clot and IVC filter placement was started on apixiban for anticoagulation.     He was seen as a consultation by our practice in July 2022 when he presented to [facility] with elevated LFTs and encephalopathy along with concern for acute alcohol withdrawal. US at that time revealed hepatomegaly and hepatic steatosis with subtle nodularity of the liver margin, concerning for cirrhosis. He underwent paracentesis 07/10/22 with 3.4L removed, negative for SBP. He was subsequently started on Prednisolone 40mg daily for acute alcoholic hepatitis    Patient report his hospitalization in July was there first time he was ever told he had liver disease. Reports last drink was July 09, 2022. Prior this point denies any LE edema, however, prior to this admission did develop RLE edema. Denies any issues with confusion since last admission. Denies any melena or hematochezia. Has not had upper endoscopy or colonoscopy.        PAST MEDICAL HISTORY:  Patient Active Problem List    Diagnosis Date Noted     Pulmonary embolism (H) 08/19/2022     Priority: Medium     HTN, goal below 140/90 04/20/2012     Priority: Medium     Swelling of right testicle 04/20/2012     Priority: Medium     Pain in right testicle 04/20/2012     Priority:  Medium     Epididymitis 04/20/2012     Priority: Medium     CARDIOVASCULAR SCREENING; LDL GOAL LESS THAN 160 04/20/2012     Priority: Medium          ROS: A comprehensive ten point review of systems was negative aside from those in mentioned in the HPI.       MEDICATIONS:   Prior to Admission medications    Medication Sig Start Date End Date Taking? Authorizing Provider   furosemide (LASIX) 40 MG tablet Take 40 mg by mouth every morning   Yes Unknown, Entered By History   lactulose (CHRONULAC) 10 GM/15ML solution Take 30 g by mouth 3 times daily   Yes Unknown, Entered By History   metFORMIN (GLUCOPHAGE) 500 MG tablet Take 500 mg by mouth 2 times daily (with meals)   Yes Unknown, Entered By History   spironolactone (ALDACTONE) 100 MG tablet Take 100 mg by mouth every morning   Yes Unknown, Entered By History        ALLERGIES:   Allergies   Allergen Reactions     Amoxicillin Hives     Per patient        SOCIAL HISTORY:  Social History     Tobacco Use     Smoking status: Current Every Day Smoker     Packs/day: 1.00     Years: 17.00     Pack years: 17.00     Smokeless tobacco: Current User     Types: Chew   Substance Use Topics     Alcohol use: Yes     Comment: used 3 x a week now once a week     Drug use: No     Comment: past use of marihuana, cocaine, lsd        FAMILY HISTORY:  Blood clot in patient's mother  Family History   Problem Relation Age of Onset     Diabetes Mother      Obesity Mother      Alcohol/Drug Father      Alcohol/Drug Maternal Grandmother      Cerebrovascular Disease Maternal Grandfather      Diabetes Maternal Grandfather      Alcohol/Drug Maternal Grandfather      Hearing Loss Maternal Grandfather      Alcohol/Drug Paternal Grandmother      Alcohol/Drug Paternal Grandfather      Asthma No family hx of      C.A.D. No family hx of      Hypertension No family hx of      Breast Cancer No family hx of      Cancer - colorectal No family hx of      Prostate Cancer No family hx of      Cancer No family hx  "of      Allergies No family hx of      Alzheimer Disease No family hx of      Anesthesia Reaction No family hx of      Arthritis No family hx of      Blood Disease No family hx of      Cardiovascular No family hx of      Circulatory No family hx of      Congenital Anomalies No family hx of      Connective Tissue Disorder No family hx of      Depression No family hx of      Endocrine Disease No family hx of      Eye Disorder No family hx of      Genetic Disorder No family hx of      Gastrointestinal Disease No family hx of      Genitourinary Problems No family hx of      Gynecology No family hx of      Heart Disease No family hx of      Lipids No family hx of      Musculoskeletal Disorder No family hx of      Neurologic Disorder No family hx of      Osteoporosis No family hx of      Psychotic Disorder No family hx of      Respiratory No family hx of      Thyroid Disease No family hx of         PHYSICAL EXAM:   /86 (BP Location: Right arm)   Pulse 102   Temp 97.1  F (36.2  C) (Oral)   Resp 16   Ht 1.905 m (6' 3\")   Wt 146.9 kg (323 lb 12.8 oz)   SpO2 95%   BMI 40.47 kg/m       PHYSICAL EXAM:  General: alert, oriented, NAD  SKIN: no suspicious lesions, rashes, jaundice, or spider angiomas  HEAD: Normocephalic. No masses, lesions, tenderness or abnormalities  NECK: Neck supple. No adenopathy. Thyroid symmetric, normal size.  EYES: + Scleral icterus  RESPIRATORY: negative, Good diaphragmatic excursion. Lungs clear  CARDIOVASCULAR: Regular rate, rhythm  GASTROINTESTINAL: +BS, abdomen distended, non-tender  JOINT/EXTREMITIES: + RLE edema, no edema noted in LLE  NEURO: Answers questions appropriately, no asterixis   PSYCH: no abnormal anxiety/depression  LYMPH: No anterior cervical, posterior cervical, or supraclavicular adenopathy     LABS:  I reviewed the patient's new clinical lab test results.   Recent Labs   Lab Test 08/21/22  0559 08/19/22  0751 08/18/22  2251   WBC 6.5 6.5 7.3   HGB 13.7 14.3 15.4   MCV " 97 96 95   * 113* 115*   INR  --   --  1.52*     Recent Labs   Lab Test 08/21/22  0559 08/19/22  0751 08/18/22  2251    133 133   POTASSIUM 4.0 4.2 4.3   CHLORIDE 106 103 102   CO2 25 24 26   BUN 10 7 7   ANIONGAP 5 6 5   SHERI 8.2* 8.2* 8.3*     Recent Labs   Lab Test 08/21/22  0559 08/18/22  2251   ALBUMIN 2.1* 2.3*   BILITOTAL 2.3* 3.1*   ALT 24 30   AST 37 47*   ALKPHOS 90 100        IMAGING  I personally reviewed the patient's new imaging results.     CONSULTATION ASSESSMENT AND PLAN:    Active Problems:    42 year old male with history of alcoholic cirrhosis with L LE DVT and submassive PE now s/p thrombectomy seen in consultation for anticoagulation given recent PE. Patient has significant LE edema in the LLE, minimal edema in RLE with abdominal distension. Patient reports he has been sober since 07/09/22 and has not had repeat paracentesis for about 1 month. Abdomen is distended, however, patient denies any abdominal pain or shortness of breath. Given the hypercoagulable state and coagulopathy associated with Cirrhosis in the setting of DVT/ PE anticoagulation is indicated. He is on Eliquis, however, often Warfarin is preferred in patients with decompensated Cirrhosis. However, would defer to hematology for further recommendations.       Recommendations:  -Continue anticoagulation given  PE/ DVT and hypercoagulable state along with coagulopathy given Cirrhosis. Appreciate hematology recommendations for anticoagulation.   - Paracentesis PRN  -Continue Lasix 40mg, Aldactone 100mg   -Continue low sodium diet  -Will need outpatient GI follow up- [facility] will help arrange  -Will need outpatient EGD in the future       Discussed with Dr. Weaver    Total time spent:  Approximately, 30 minutes was spent providing patient care, including patient evaluation, reviewing documentation/test results, and . Thank you for asking us to participate in the care of this patient.       Nena Márquez PA-C    [facility]  Office: [phone]

## 2022-08-22 NOTE — CONSULTS
"Triage and Transition - Consult and Liaison     Jareth Limon  August 21, 2022    Psychiatry consult acknowledged. Consult unable to be completed due to patient unavailable. Called unit at 7:59PM and was told RN would call back. 8:47PM Received call from Sabrina NORRIS RN, who stated that patient is too sleepy and to attempt consult tomorrow.     Sabrina NORRIS RN, confirmed that patient's primary concern is alcohol use. Sabrina NORRIS RN, stated that patient's \"affect is flat,\" and that he does not answer much more than \"yes or no.\" Sabrina NORRIS RN, confirmed that both IP Psychiatry and IP THANH consults are ordered and both are still requested.     Will attempt consult again tomorrow, 8/22, or next day according to provider availability. .     CRISTIANA NELSON M.Ed., [facility], [facility]  Triage and Transition - Consult and Liaison   [phone]    "

## 2022-08-22 NOTE — PROGRESS NOTES
[facility]    Hospitalist Progress Note    Date of Service (when I saw the patient): 08/22/2022  Admit date: 8/18/2022    Interval History   Full details of events over last 24 hours outlined below.   Patient feels overwhelmed but optimistic that life will start to get better. He has not drank EtOH since his hospitalization a month ago for acute withdrawal with liver failure in [place]. He does not have a PCP or liver doctor currently.   He plans to go live with his dad in [place] at discharge. He would go to [place] for medical care.   Denies any SOB, CP, abdominal pain, N/V/D.    Assessment & Plan   Jareth Limon is a 42 year old male with a past medical history of with a past medical history of hypertension, alcoholic liver cirrhosis with ascites presents to hospital with right lower extremity swelling and pain found to have submassive PE without right heart strain and right lower extremity DVT.    Patient presented to [facility] with right lower extremities pain and swelling found to have extensive DVT of right lower extremity as well as bilateral large PE.     Acute extensive right lower extremity DVT  Acute extensive bilateral pulmonary emboli  S/p bilateral thrombectomy on 8/19 by IR.   Echo with no evidence of right heart strain.  Has been on hep drip.     Switched to apxiban (consult pharmacy [facility] regarding coverage).    DOAC is not recommended in Class C cirrhosis - Hematology re-consulted to comment regarding this recommendation in the context of class C cirrhosis. Benefit outweighs risk?     Appreciate hemonc/IR assistance.    Long-term AC and thrombophilia w/u  per hemonc (pt [place] and need hematologist close home).    Compression stocking to RLE    Alcoholic liver cirrhosis with moderate ascites, CLASS C  Alcohol use disorder  Elevated junior and INR on admit.  He had a paracentesis performed approximately 1 month ago and reports that his abdomen  has been significantly less distended since then.  * Moderate ascites on ultrasound on 8/21/22     MELD-Na score: 18 at 8/19/2022  7:51 AM  MELD score: 15 at 8/19/2022  7:51 AM  Calculated from:  Serum Creatinine: 0.61 mg/dL (Using min of 1 mg/dL) at 8/19/2022  7:51 AM  Serum Sodium: 133 mmol/L at 8/19/2022  7:51 AM  Total Bilirubin: 3.1 mg/dL at 8/18/2022 10:51 PM  INR(ratio): 1.52 at 8/18/2022 10:51 PM  Age: 42 years      - Low salt diet.  - resumed PTA lasix and spironolactone.  - Monitor LFTs.  - Folate/thiamine/MVI.  - CD/psych consult prior to discharge (family/pt).  - Given new diagnosis of cirrhosis, GI consulted prior to discharge to make recommendation re: further evaluation prior to discharge and follow up. Also, any input regarding anticoagulation is appreciated.     Recent Labs   Lab 08/21/22 0559 08/18/22 2251   ALBUMIN 2.1* 2.3*   BILITOTAL 2.3* 3.1*   ALT 24 30   AST 37 47*   ALKPHOS 90 100   INR  --  1.52*       Recent Labs   Lab 08/18/22  2251   INR 1.52*     Recent Labs   Lab 08/21/22 0559 08/19/22 0751 08/18/22  2251   * 113* 115*     Vitals:    08/18/22 2200 08/19/22 0555 08/20/22 0624 08/21/22 0613   Weight: 148.6 kg (327 lb 9.6 oz) 148.5 kg (327 lb 6.4 oz) 146.1 kg (322 lb) 148.8 kg (328 lb)    08/22/22 0522   Weight: 146.9 kg (323 lb 12.8 oz)     I/O last 3 completed shifts:  In: 874.58 [P.O.:360; I.V.:514.58]  Out: -   Recent Labs   Lab 08/21/22  0559 08/19/22  0751 08/18/22  2251   CO2 25 24 26    133 133   POTASSIUM 4.0 4.2 4.3   BUN 10 7 7   CR 0.73 0.61* 0.64*            DMII, A1c 7.9 8/19   Hemoglobin A1C   Date Value Ref Range Status   08/19/2022 7.9 (H) 0.0 - 5.6 % Final     Comment:     Normal <5.7%   Prediabetes 5.7-6.4%    Diabetes 6.5% or higher     Note: Adopted from ADA consensus guidelines.     Recent Labs   Lab 08/22/22  0858 08/21/22  2100 08/21/22  1704 08/21/22  0736 08/21/22  0559 08/21/22  0205   * 179* 124* 151* 143* 166*          Stopped  "metformin PTA, given class C cirrhosis. Can resumed if improves Class B Unknown last PTA A1C. Currently 7.9.    Started 5 units glargine on 08/22/22, so that he will have this available to him at discharge to adjust as needed if AM sugars start to climb. Patient has a working glucometer.     Nutrition consult - diabetic diet and low salt for cirrhosis.     Discussed lifestyle changes and initiation of insulin with patient.      DAMIAN: had not used his CPAP machine for quite sometiime as it broke.    CPAP provided here    Discussed with CC  Helping to set up sleep study post discharge.     Clinically Significant Risk Factors Present on Admission               # DMII: A1C = N/A within past 3 months  # Severe Obesity: Estimated body mass index is 40.47 kg/m  as calculated from the following:    Height as of this encounter: 1.905 m (6' 3\").    Weight as of this encounter: 146.9 kg (323 lb 12.8 oz).          Diet: Orders Placed This Encounter      Combination Diet Moderate Consistent Carb (60 g CHO per Meal) Diet; 2 gm NA Diet     IVF: None  [place] Catheter: Not present     DVT Prophylaxis: DOAC  Code Status: Full Code     Disposition: Expected discharge 8/23/22 to home.   Communication: Discussed with RN, patient and CC on 08/22/22    Teri Márquez MD  Hospitalist Service  [facility]  Securely message with the Vocera Web Console (learn more here)  Text page via [employer]Ring Paging/Directory    Total time spent:  > 35 minutes on 08/22/22  Discussed with patient, bedside RN, and CC.  More than 80% of time spent in direct patient care, care coordination, patient/caregiver counseling, and formalizing plan of care.        -Data reviewed today: I reviewed all new labs and imaging results over the last 24 hours. I personally reviewed the EKG tracing showing NSR without arrhytmia.    Physical Exam   Temp: 98.3  F (36.8  C) Temp src: Oral BP: (!) 139/95 Pulse: 99   Resp: 16 SpO2: 92 % O2 Device: None (Room " air)    Vitals:    08/20/22 0624 08/21/22 0613 08/22/22 0522   Weight: 146.1 kg (322 lb) 148.8 kg (328 lb) 146.9 kg (323 lb 12.8 oz)     Vital Signs with Ranges  Temp:  [97.7  F (36.5  C)-98.7  F (37.1  C)] 98.3  F (36.8  C)  Pulse:  [] 99  Resp:  [16] 16  BP: (107-139)/(63-95) 139/95  SpO2:  [92 %-97 %] 92 %  I/O last 3 completed shifts:  In: 874.58 [P.O.:360; I.V.:514.58]  Out: -     Today's Exam  Constitutional: NAD,   Neuropsyche:  alert and oriented, answers questions appropriately.   Respiratory:  Breathing comfortably, good air exchange, no wheezes, no crackles.   Cardiovascular:  Regular rate and rhythm, 1+ edema on the left, 2+ on R LE  GI:  soft, NT/moderately distended abdomen with fluid wave, BS normal  Skin/Integumen:  No acute rash or sign of bleeding.     Medications   All medications reviewed on 08/22/22     Injection Device for insulin       - MEDICATION INSTRUCTIONS -         apixaban ANTICOAGULANT  10 mg Oral BID    Followed by     [START ON 8/28/2022] apixaban ANTICOAGULANT  5 mg Oral BID     furosemide  40 mg Oral QAM     insulin aspart  0.5-2.5 Units Subcutaneous TID AC     insulin aspart  0.5-2.5 Units Subcutaneous At Bedtime     lactulose  30 g Oral TID     sodium chloride (PF)  3 mL Intracatheter Q8H     spironolactone  100 mg Oral QAM     thiamine  100 mg Oral Daily     PRN Meds: acetaminophen, glucose **OR** dextrose **OR** glucagon, HYDROmorphone, Injection Device for insulin, lidocaine 4%, lidocaine (buffered or not buffered), melatonin, naloxone **OR** naloxone **OR** naloxone **OR** naloxone, ondansetron **OR** ondansetron, - MEDICATION INSTRUCTIONS -, senna-docusate **OR** senna-docusate, sodium chloride (PF)    Data   Recent Labs   Lab 08/22/22  0858 08/21/22  2100 08/21/22  1704 08/21/22  0736 08/21/22  0559 08/20/22  1203 08/19/22  2111 08/18/22  4571   WBC  --   --   --   --  6.5  --  6.5 7.3   HGB  --   --   --   --  13.7  --  14.3 15.4   MCV  --   --   --   --  97  --  96  95   PLT  --   --   --   --  125*  --  113* 115*   INR  --   --   --   --   --   --   --  1.52*   NA  --   --   --   --  136  --  133 133   POTASSIUM  --   --   --   --  4.0  --  4.2 4.3   CHLORIDE  --   --   --   --  106  --  103 102   CO2  --   --   --   --  25  --  24 26   BUN  --   --   --   --  10  --  7 7   CR  --   --   --   --  0.73  --  0.61* 0.64*   ANIONGAP  --   --   --   --  5  --  6 5   SHERI  --   --   --   --  8.2*  --  8.2* 8.3*   * 179* 124*   < > 143*   < > 138* 161*   ALBUMIN  --   --   --   --  2.1*  --   --  2.3*   PROTTOTAL  --   --   --   --  5.1*  --   --  5.7*   BILITOTAL  --   --   --   --  2.3*  --   --  3.1*   ALKPHOS  --   --   --   --  90  --   --  100   ALT  --   --   --   --  24  --   --  30   AST  --   --   --   --  37  --   --  47*    < > = values in this interval not displayed.       Recent Results (from the past 24 hour(s))   US Abdomen Complete Portable    Narrative    EXAM: US ABDOMEN LIMITED PORTABLE  LOCATION: [facility]  DATE/TIME: 8/21/2022 11:02 AM    INDICATION: Abdominal distention. Ascites check.  COMPARISON: Chest CTA 08/18/2022 and ultrasound 07/11/2022  TECHNIQUE: Complete abdominal ultrasound.    FINDINGS:    Scans of the 4 quadrants demonstrate moderate anechoic ascites. Findings similar to chest CTA 08/18/2022 and new from ultrasound 07/11/2022.

## 2022-08-22 NOTE — PROGRESS NOTES
Care Management Follow Up    Length of Stay (days): 3    Expected Discharge Date: 08/23/2022     Concerns to be Addressed: all concerns addressed in this encounter     Patient plan of care discussed at interdisciplinary rounds: Yes    Anticipated Discharge Disposition: Home, Inpatient Chemical Dependency     Anticipated Discharge Services: Mental Health Resources, Chemical Dependency Resources  Anticipated Discharge DME: Other (see comment) (CPAP)    Patient/family educated on Medicare website which has current facility and service quality ratings: yes  Education Provided on the Discharge Plan:    Patient/Family in Agreement with the Plan: yes    Referrals Placed by CM/SW: Durable Medical Equipment (DME)  Private pay costs discussed: patient currently without insurance-price check for meds    Additional Information:  Referral made to the  Sleep Clinic and appointment arranged for Sept. 1st for new Cpap eval.  Since patient unable to provide any info about past sleep studies or vendor info for Cpap he has had in the past.    Ngoc Allison RN  Care Coordinator  [facility]  [phone] (text or call)

## 2022-08-22 NOTE — PROGRESS NOTES
Service Date: 08/22/2022    SUBJECTIVE:  Mr. Limon is a 42-year-old gentleman with multiple medical problems including alcoholic liver cirrhosis and diabetes mellitus.  The patient was seen in an outside hospital because of right lower extremity pain and swelling.  Imaging studies revealed right lower extremity DVT and also pulmonary embolism.  The patient was transferred to [facility] for mechanical thrombectomy.    Lower extremity ultrasound done here and revealed nonocclusive thrombus in right common femoral vein.  He was evaluated by the interventional radiologist.  The patient had mechanical thrombectomy of a saddle pulmonary embolism and also placement of IVC filter.      The patient was on heparin.  He is now on Eliquis.  Hematology has now been asked to see him again regarding safety of Eliquis in Child-Hassan class C liver disease.    The patient has alcoholic liver cirrhosis.  He has Child-Hassan class B or C liver impairment.    The patient is better.  No bleeding from any site.  No chest pain or shortness of breath.  No pain in the lower extremities.    This is the first episode of blood clot.  Discussed regarding risk factors.  The patient has not had any recent surgery.  He has not been bedridden.  The patient is not very active, but he is not bedridden.  No prolonged car ride.  No recent flying.  No trauma to his lower extremities.  He is not on any medication that can cause thrombosis.    PHYSICAL EXAMINATION:    GENERAL:  He is alert, oriented x3.  Not in distress.  VITAL SIGNS:  Reviewed.  Rest of systems not examined.    LABS:  Reviewed.    ASSESSMENT:     1.  A 42-year-old gentleman with unprovoked right lower extremity DVT and submassive pulmonary embolism.  2.  Alcoholic liver cirrhosis.  3.  Other medical problems including hypertension and diabetes mellitus.    PLAN:     1.  The patient has unprovoked thrombosis.  The patient needs indefinite anticoagulation.    Discussed  regarding anticoagulation.  He has liver cirrhosis.  He has moderate to severe liver impairment (Child-Hassan class B or C).    He is on Eliquis.  Eliquis is not recommended any Child-Hassan class C.  It is to be used with caution in Child-Hassan class B.  For Xarelto, it is recommended to avoid using in Child-Hassan class B or C.    One option for him would be dabigatran.  It can be used in hepatic impairment.  No dose adjustment is recommended.  One other option would be warfarin.  If warfarin is used, chromogenic factor X should be used for monitoring the dose. I will discuss it with hospitalist team.    2.  There is a family history of thrombosis in his mother.  Factor II and factor V mutation are pending.    3.  The patient is advised to follow up with Dr. Webb in Hematology/Oncology clinic in 3 months.    4.  The patient had a few questions, which were all answered. Case discussed with Dr. Teri Márquez.  Hematology/Oncology will sign off.    TOTAL TIME SPENT:  25 minutes.  Time spent in today's visit, review of chart/investigations today, communicating with other providers and documentation today.    Joanna Cordero MD        D: 2022   T: 2022   MT: ARTUR    Name:     NICK HENDERSON  MRN:      [id number]        Account:      [id number]   :      1980           Service Date: 2022       Document: [id number]

## 2022-08-22 NOTE — CONSULTS
REASON FOR ASSESSMENT:  Provider Order - diabetic diet, low salt diet for cirrhosis    NUTRITION HISTORY:    Information obtained from:  Patient and his father at bedside    Living situation:   Has been staying with his father and step mom    Grocery shopping:  Step mom    Meal preparation:  Step mom    Breakfast:  Cereal, milk, fruit    Lunch:  none    Dinner:   Salad, grilled chicken     Pt's father notes they eat very healthy. Step mom is into holistic ej and reads labels often.     Previous diet instructions:  He is familiar with the diet restrictions      CURRENT DIET:  Mod CHO, 2g Na diet    NUTRITION DIAGNOSIS:  Food- and nutrition-related knowledge deficit R/t low sodium and DM diet ed AEB patient familiar but not actively following the recommendations.      INTERVENTIONS:    Nutrition Prescription:  High CHO  2g Na diet     Implementation:    Assessed learning needs, learning preferences, and willingness to learn    Nutrition Education (Content):  a) Provided handouts:  1) Tips for Low Na Diet  2) Label Reading  3) Low Na Foods/Drinks  4) Seasoning Your Food Without Salt  5) Low Na Recipe Booklet  6) Carbohydrate Counting   b) Discussed rationale for limiting Na for CHF, and balancing meals for DM   c) Encouraged patient to keep a daily food record    Nutrition Education (Application):  a) Discussed current eating habits and recommended alternative food choices    Anticipated good compliance w/ help from family     Diet Education - refer to Education Flowsheet    Goals:    Patient verbalizes understanding of diet     All of the above goals met during the education session    Follow Up:    Provided RD contact information for future questions.    Recommend Out-Patient Nutrition Referral, if further diet instructions are needed.    Korin Starr RD, LD  [facility], [facility], [facility]  Pager: [phone]  Weekend Pager: [phone]      '

## 2022-08-22 NOTE — PROGRESS NOTES
This is GI team supervisory note. Full note currently pending (incomplete section) from my team Ms. Nena Márquez.       I have visited with the patient and agree with plan as documented per Ms. Márquez.     #1 Cirrhosis, from alcohol use, MELD-Na 18  #2 New DVT and PE s/p mechanical thrombectomy and IVC filter placement      Cirrhosis associated with both coagulopathy and hypercoagulable state.     - Appreciate excellent recs per Hem/Onc team, agree with the plan for anticoagulation per hem/onc.     Outpatient [facility] liver clinic follow up and outpatient EGD (non urgent) will be arranged.     Susi Weaver MD  [facility]  Phone [phone]

## 2022-08-22 NOTE — PHARMACY-ANTICOAGULATION SERVICE
Clinical Pharmacy - Warfarin Dosing Consult     Pharmacy has been consulted to manage this patient s warfarin therapy.  Indication: DVT/ PE Treatment  Therapy Goal: Chr Factor 10: 20-40%  Warfarin Prior to Admission: No  Significant drug interactions: lovenox    INR   Date Value Ref Range Status   08/22/2022 1.66 (H) 0.85 - 1.15 Final   08/18/2022 1.52 (H) 0.85 - 1.15 Final       Recommend warfarin 10 mg today.  Pharmacy will monitor Jareth Limon daily and order warfarin doses to achieve specified goal.      Please contact pharmacy as soon as possible if the warfarin needs to be held for a procedure or if the warfarin goals change.

## 2022-08-22 NOTE — PROGRESS NOTES
Pt VS stable today, pain free.  Sutures removed per IR PA, slight ooze onto dressing on right side. Left groin CDI.  Maintenance lactulose started, and abd US complete.  Heparin gtt turned off in AM and eliquis initiated.  [place] from out of area, and will likely need to have some sort of follow up but as it is understood, this may be complicated by his present lack of medical insurance.  SW/CC are consulted and involved.  Still awaiting a Chem-Dep consult and Psych consult prior to discharge. CC states has reached out to  Financial Counseling.

## 2022-08-23 VITALS
RESPIRATION RATE: 18 BRPM | HEART RATE: 102 BPM | TEMPERATURE: 97.8 F | OXYGEN SATURATION: 91 % | HEIGHT: 75 IN | SYSTOLIC BLOOD PRESSURE: 102 MMHG | DIASTOLIC BLOOD PRESSURE: 65 MMHG | WEIGHT: 315 LBS | BODY MASS INDEX: 39.17 KG/M2

## 2022-08-23 DIAGNOSIS — I26.99 PULMONARY EMBOLISM (H): Primary | ICD-10-CM

## 2022-08-23 LAB
ALBUMIN SERPL-MCNC: 2.2 G/DL (ref 3.4–5)
ALP SERPL-CCNC: 92 U/L (ref 40–150)
ALT SERPL W P-5'-P-CCNC: 21 U/L (ref 0–70)
ANION GAP SERPL CALCULATED.3IONS-SCNC: 6 MMOL/L (ref 3–14)
AST SERPL W P-5'-P-CCNC: 34 U/L (ref 0–45)
BILIRUB SERPL-MCNC: 3 MG/DL (ref 0.2–1.3)
BUN SERPL-MCNC: 7 MG/DL (ref 7–30)
CALCIUM SERPL-MCNC: 8.5 MG/DL (ref 8.5–10.1)
CHLORIDE BLD-SCNC: 105 MMOL/L (ref 94–109)
CO2 SERPL-SCNC: 24 MMOL/L (ref 20–32)
CREAT SERPL-MCNC: 0.69 MG/DL (ref 0.66–1.25)
FACT X ACT/NOR PPP CHRO: 48 % (ref 70–130)
GFR SERPL CREATININE-BSD FRML MDRD: >90 ML/MIN/1.73M2
GLUCOSE BLD-MCNC: 134 MG/DL (ref 70–99)
GLUCOSE BLDC GLUCOMTR-MCNC: 127 MG/DL (ref 70–99)
GLUCOSE BLDC GLUCOMTR-MCNC: 147 MG/DL (ref 70–99)
GLUCOSE BLDC GLUCOMTR-MCNC: 186 MG/DL (ref 70–99)
INR PPP: 1.63 (ref 0.85–1.15)
PLATELET # BLD AUTO: 133 10E3/UL (ref 150–450)
POTASSIUM BLD-SCNC: 3.9 MMOL/L (ref 3.4–5.3)
PROT SERPL-MCNC: 5.4 G/DL (ref 6.8–8.8)
SODIUM SERPL-SCNC: 135 MMOL/L (ref 133–144)

## 2022-08-23 PROCEDURE — 36415 COLL VENOUS BLD VENIPUNCTURE: CPT | Performed by: INTERNAL MEDICINE

## 2022-08-23 PROCEDURE — 99232 SBSQ HOSP IP/OBS MODERATE 35: CPT | Performed by: PSYCHIATRY & NEUROLOGY

## 2022-08-23 PROCEDURE — 250N000013 HC RX MED GY IP 250 OP 250 PS 637: Performed by: HOSPITALIST

## 2022-08-23 PROCEDURE — 80053 COMPREHEN METABOLIC PANEL: CPT | Performed by: INTERNAL MEDICINE

## 2022-08-23 PROCEDURE — 85260 CLOT FACTOR X STUART-POWER: CPT | Performed by: INTERNAL MEDICINE

## 2022-08-23 PROCEDURE — 85610 PROTHROMBIN TIME: CPT | Performed by: INTERNAL MEDICINE

## 2022-08-23 PROCEDURE — 99239 HOSP IP/OBS DSCHRG MGMT >30: CPT | Performed by: INTERNAL MEDICINE

## 2022-08-23 PROCEDURE — 250N000011 HC RX IP 250 OP 636: Performed by: INTERNAL MEDICINE

## 2022-08-23 PROCEDURE — 85049 AUTOMATED PLATELET COUNT: CPT | Performed by: HOSPITALIST

## 2022-08-23 RX ORDER — INSULIN GLARGINE 100 [IU]/ML
5 INJECTION, SOLUTION SUBCUTANEOUS EVERY MORNING
Qty: 15 ML | Refills: 0 | Status: SHIPPED | OUTPATIENT
Start: 2022-08-23

## 2022-08-23 RX ORDER — ENOXAPARIN SODIUM 150 MG/ML
0.75 INJECTION SUBCUTANEOUS EVERY 12 HOURS
Qty: 8 ML | Refills: 0 | Status: SHIPPED | OUTPATIENT
Start: 2022-08-23 | End: 2022-08-23

## 2022-08-23 RX ORDER — WARFARIN SODIUM 2.5 MG/1
TABLET ORAL
Qty: 100 TABLET | Refills: 0 | Status: SHIPPED | OUTPATIENT
Start: 2022-08-23

## 2022-08-23 RX ORDER — ENOXAPARIN SODIUM 150 MG/ML
0.75 INJECTION SUBCUTANEOUS EVERY 12 HOURS
Qty: 8 ML | Refills: 0 | Status: SHIPPED | OUTPATIENT
Start: 2022-08-23

## 2022-08-23 RX ADMIN — LACTULOSE 30 G: 20 SOLUTION ORAL at 09:32

## 2022-08-23 RX ADMIN — FUROSEMIDE 40 MG: 40 TABLET ORAL at 09:30

## 2022-08-23 RX ADMIN — ENOXAPARIN SODIUM 105 MG: 120 INJECTION SUBCUTANEOUS at 09:28

## 2022-08-23 RX ADMIN — SPIRONOLACTONE 100 MG: 25 TABLET ORAL at 09:30

## 2022-08-23 RX ADMIN — THIAMINE HCL TAB 100 MG 100 MG: 100 TAB at 09:30

## 2022-08-23 RX ADMIN — INSULIN GLARGINE 5 UNITS: 100 INJECTION, SOLUTION SUBCUTANEOUS at 09:35

## 2022-08-23 ASSESSMENT — ACTIVITIES OF DAILY LIVING (ADL)
ADLS_ACUITY_SCORE: 20

## 2022-08-23 NOTE — PROGRESS NOTES
Care Management Discharge Note    Discharge Date: 08/23/2022       Discharge Disposition: Home,     Discharge Services: Mental Health Resources, [facility] Dependency [facility]    Discharge DME: Other (see comment) (CPAP)    Discharge Transportation: family or friend will provide    Private pay costs discussed: insurance costs out of pocket expenses      Education Provided on the Discharge Plan: yes   Persons Notified of Discharge Plans: Father  Patient/Family in Agreement with the Plan: yes    Handoff Referral Completed: Yes    Additional Information:  Patient discharging today to [place] with his father. New PCP appointment made. Patient will have  Labs drawn  At the [facility] after followed by MD. Spoke with IR who will call patient regarding IVC filter. Patient was informed that he would need to call Oncology Clinic after obtaining insurance per  Oncology clinic.        Walker Hitchcock RN CC

## 2022-08-23 NOTE — DISCHARGE SUMMARY
[facility]  Hospitalist Discharge Summary      Date of Admission:  8/18/2022  Date of Discharge:  8/23/2022  Discharging Provider: Teri Márquez MD  Discharge Service: Hospitalist Service    Discharge Diagnoses   Acute extensive right lower extremity DVT  Acute extensive bilateral pulmonary emboli  S/p bilateral thrombectomy on 8/19 by IR.   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   Establish care with coumadin clinic for chromogenic factor X monitoring,   coumadin and lovenox dosing. You should have a chromogenic factor X level   tomorrow AM 8/24.   Please arrange follow up with IR to remove IVC filter PRIOR to patient   leaving hospital today.   Follow up with Dr. Webb or [place] [facility] in [place] in 3 months.  Outpatient [facility] liver clinic follow up and outpatient EGD (non urgent)   will be arranged through [facility].  Follow up with Sleep Clinic on Sept 1st to set up CPAP.             Factor 2 and 5 mutation analysis pending. This will be followed up by hematology.     Discharge Disposition   Discharged to home  Condition at discharge: Good      Hospital Course   Jareth Limon is a 42 year old male with a past medical history of with a past medical history of hypertension, alcoholic liver cirrhosis with ascites presents to hospital with right lower extremity swelling and pain found to have submassive PE without right heart strain and right lower extremity DVT.    Patient presented to [facility] with right lower extremities pain and swelling found to have extensive DVT of right lower extremity as well as bilateral large PE.     Acute extensive right lower extremity  DVT  Acute extensive bilateral pulmonary emboli  S/p bilateral thrombectomy on 8/19 by IR.   S/p IVC filter placement by IR  Echo with no evidence of right heart strain.  Initially treated on hep drip, then DOAC. However, due to cirrhosis (see below) I consulted hematology again on 8/22/22 regarding recommendation for anticoaguation.     Hematology  recommended coumadin with enoxaparin bridge due to both liver disease and obesity. Pharmacy dosed enoxaparin based on obesity status. He was started on coumadin 5 mg daily and received one dose on 8/22/22 prior to discharge.     Monitor coumadin with chromogenic factor X with further dosing per coumadin clinic. He should have next level 8/24/22.    Stop enoxaparin once Chr Factor 10: 20-40% for 24 hours.     Removal of IVC filter per IR. Follow up for removal should be scheduled prior to discharge.     He has a family history of thrombosis in his hs mother. Long-term AC and thrombophilia w/u per hemonc in 3 months. He should follow up with [facility] here or near home. Factor II and factor V mutation are pending at time of discharge and should be reviewed at follow up with hematologist.     Compression stocking to RLE    Nutrition consult re: vitamin K placed on day of discharge.     Alcoholic liver cirrhosis with moderate ascites, CLASS C  Alcohol use disorder, sober > 1 month  Elevated junior and INR on admit.  He had a paracentesis performed approximately 1 month ago and reports that his abdomen has been significantly less distended since then.  * Moderate ascites on ultrasound on 8/21/22     MELD-Na score: 18 at 8/19/2022  7:51 AM  MELD score: 15 at 8/19/2022  7:51 AM  Calculated from:  Serum Creatinine: 0.61 mg/dL (Using min of 1 mg/dL) at 8/19/2022  7:51 AM  Serum Sodium: 133 mmol/L at 8/19/2022  7:51 AM  Total Bilirubin: 3.1 mg/dL at 8/18/2022 10:51 PM  INR(ratio): 1.52 at 8/18/2022 10:51 PM  Age: 42 years    - Low salt diet.  - Resumed PTA lasix and  spironolactone.  - Folate/thiamine/MVI given during his stay.   - CD/psych consult prior to discharge (family/pt).   - Appreciate chemical dep consult and recs:  1. Abstain from all non-prescribed mood-altering substances  2. Take all medications as prescribed  3. Enter and complete a residential or inpatient treatment program  4. Increase sober support, attend support meetings at least one time weekly              5.   Follow all recommendations of your medical providers  - Evaluated by psychiatry. Consult note pending.   - Given new diagnosis of cirrhosis, [facility] consulted prior to discharge. They will arrange follow up with [facility] with non-urgent EGD.     Recent Labs   Lab 08/22/22  1242 08/21/22  0559 08/18/22  2251   ALBUMIN  --  2.1* 2.3*   BILITOTAL  --  2.3* 3.1*   ALT  --  24 30   AST  --  37 47*   ALKPHOS  --  90 100   INR 1.66*  --  1.52*         Recent Labs   Lab 08/21/22  0559 08/19/22  0751 08/18/22  2251   * 113* 115*       Vitals:    08/19/22 0555 08/20/22 0624 08/21/22 0613 08/22/22 0522   Weight: 148.5 kg (327 lb 6.4 oz) 146.1 kg (322 lb) 148.8 kg (328 lb) 146.9 kg (323 lb 12.8 oz)    08/23/22 0500   Weight: 147.2 kg (324 lb 8 oz)     I/O last 3 completed shifts:  In: 480 [P.O.:480]  Out: -   Recent Labs   Lab 08/21/22  0559 08/19/22  0751 08/18/22  2251   CO2 25 24 26    133 133   POTASSIUM 4.0 4.2 4.3   BUN 10 7 7   CR 0.73 0.61* 0.64*          DMII, A1c 7.9 8/19   Hemoglobin A1C   Date Value Ref Range Status   08/19/2022 7.9 (H) 0.0 - 5.6 % Final     Comment:     Normal <5.7%   Prediabetes 5.7-6.4%    Diabetes 6.5% or higher     Note: Adopted from ADA consensus guidelines.     Recent Labs   Lab 08/23/22  0156 08/22/22  2208 08/22/22  1839 08/22/22  1419 08/22/22  0858 08/21/22  2100   * 177* 156* 176* 146* 179*              Stopped metformin PTA, given class C cirrhosis. Can resumed if improves Class B Unknown last PTA A1C. Currently 7.9.    Started 5 units glargine on 08/22/22,  so that he will have this available to him at discharge to adjust as needed if AM sugars start to climb. Patient has a working glucometer.     Nutrition consult - diabetic diet and low salt for cirrhosis.     Discussed lifestyle changes and initiation of insulin with patient.      DAMIAN: had not used his CPAP machine for quite sometiime as it broke.    CPAP provided here    [facility] arranged follow up with sleep medicine.        Consultations This Hospital Stay   PHARMACY IP CONSULT  PHARMACY IP CONSULT  INTERVENTIONAL RADIOLOGY ADULT/PEDS IP CONSULT  PHARMACY LIAISON FOR MEDICATION COVERAGE CONSULT  HEMATOLOGY & ONCOLOGY IP CONSULT  CHEMICAL DEPENDENCY IP CONSULT  PSYCHIATRY IP CONSULT  PHARMACY LIAISON FOR MEDICATION COVERAGE CONSULT  CARE MANAGEMENT / SOCIAL WORK IP CONSULT  GASTROENTEROLOGY IP CONSULT  HEMATOLOGY & ONCOLOGY IP CONSULT  PHARMACY LIAISON FOR MEDICATION COVERAGE CONSULT  NUTRITION SERVICES ADULT IP CONSULT  PHARMACY LIAISON FOR MEDICATION COVERAGE CONSULT  PHARMACY IP CONSULT  CARE MANAGEMENT / SOCIAL WORK IP CONSULT  PHARMACY TO DOSE WARFARIN  VASCULAR ACCESS ADULT IP CONSULT  CARE MANAGEMENT / SOCIAL WORK IP CONSULT    Code Status   Full Code    Time Spent on this Encounter   I, Teri Márquez MD, personally saw the patient today and spent greater than 30 minutes discharging this patient.       Teri Márquez MD  [place] [address] [address]  [facility] [place] [address]  Phone: [phone]  ______________________________________________________________________    Physical Exam   Vital Signs: Temp: 97.8  F (36.6  C) Temp src: Oral BP: 102/65 Pulse: 102   Resp: 18 SpO2: 91 % O2 Device: None (Room air)    Weight: 324 lbs 8 oz  Constitutional: NAD,   Neuropsyche:  alert and oriented, answers questions appropriately.   Respiratory:      Breathing comfortably, good air exchange, no wheezes, no crackles.   Cardiovascular:  Regular rate and rhythm, 1+ edema on the left,  2+ on R LE  GI:  soft, NT/moderately distended abdomen with fluid wave, BS normal  Skin/Integumen:  No acute rash or sign of bleeding.        Primary Care Physician   Mariano Dallas    Discharge Orders      Adult Oncology/Hematology  Referral      Activity    Your activity upon discharge: activity as tolerated     Follow-up and recommended labs and tests     Establish care with primary care doctor within 1 week with CBC, CMP.   Establish care with coumadin clinic for chromogenic factor X monitoring, coumadin and lovenox dosing. You should have a chromogenic factor X level tomorrow AM 8/24.   Please arrange follow up with IR to remove IVC filter PRIOR to patient leaving hospital today.   Follow up with Dr. Webb or [facility] in [place] in 3 months.  Outpatient [facility] liver clinic follow up and outpatient EGD (non urgent) will be arranged through [facility].  Follow up with Sleep Clinic on Sept 1st to set up CPAP.     Reason for your hospital stay    You were admitted with clot in your leg and lungs and underwent thrombectomy and IVC filter placement.   It is critical that you remain alcohol free so that your liver can heal.     Discharge Instructions    Check blood sugar every morning and before one meal a day and record.   Bring these levels to your establish care visit with your primary care provider.   If your fasting morning blood sugar is consistently over 140, increase your basaglar dose by one unit every 2 days until fasting morning sugar is under 140.    As discussed with chemical dependency counselor. The following is strongly recommended to help you stay sober.   1. Abstain from all non-prescribed mood-altering substances  2. Take all medications as prescribed  3. Enter and complete a residential or inpatient treatment program  4. Increase sober support, attend support meetings at least one time weekly  5.         Follow all recommendations of your medical providers     Compression  Sleeve/Stocking    Compression Sleeve/Stocking Documentation:   The patient needs compression stockings for healing DVT RLE.     I, the undersigned, certify that the above prescribed supplies are medically necessary for this patient and is both reasonable and necessary in reference to accepted standards of medical and necessary in reference to accepted standards of medical practice in the treatment of this patient's condition and is not prescribed as a convenience.     Diet    Follow this diet upon discharge: Orders Placed This Encounter      Combination Diet High Consistent Carb (75 g CHO per Meal) Diet; 2 gm NA Diet       Significant Results and Procedures   Most Recent 3 CBC's:Recent Labs   Lab Test 08/21/22  0559 08/19/22  0751 08/18/22 2251   WBC 6.5 6.5 7.3   HGB 13.7 14.3 15.4   MCV 97 96 95   * 113* 115*     Most Recent 3 BMP's:Recent Labs   Lab Test 08/23/22  0156 08/22/22  2208 08/22/22  1839 08/21/22  0736 08/21/22  0559 08/20/22  1203 08/19/22  0751 08/18/22  2251   NA  --   --   --   --  136  --  133 133   POTASSIUM  --   --   --   --  4.0  --  4.2 4.3   CHLORIDE  --   --   --   --  106  --  103 102   CO2  --   --   --   --  25  --  24 26   BUN  --   --   --   --  10  --  7 7   CR  --   --   --   --  0.73  --  0.61* 0.64*   ANIONGAP  --   --   --   --  5  --  6 5   SHERI  --   --   --   --  8.2*  --  8.2* 8.3*   * 177* 156*   < > 143*   < > 138* 161*    < > = values in this interval not displayed.     Most Recent 2 LFT's:Recent Labs   Lab Test 08/21/22  0559 08/18/22 2251   AST 37 47*   ALT 24 30   ALKPHOS 90 100   BILITOTAL 2.3* 3.1*     Most Recent 3 INR's:Recent Labs   Lab Test 08/22/22  1242 08/18/22 2251   INR 1.66* 1.52*     Most Recent 3 BNP's:No lab results found.  Recent Labs   Lab 08/18/22  2251   TROPONINIS 11     ,   Results for orders placed or performed during the hospital encounter of 08/18/22   US Lower Extremity Venous Duplex Left    Narrative    EXAM: US LOWER EXTREMITY  VENOUS DUPLEX LEFT  LOCATION: [facility]  DATE/TIME: 8/19/2022 6:10 AM    INDICATION: patient with pe and right lower extremity dvt, noted to have left leg swelling too.  COMPARISON: None.  TECHNIQUE: Venous Duplex ultrasound of the left lower extremity with and without compression, augmentation and duplex. Color flow and spectral Doppler with waveform analysis performed.    FINDINGS: Exam includes the common femoral, femoral, popliteal, and contralateral common femoral veins as well as segmentally visualized deep calf veins and greater saphenous vein.     LEFT: No deep vein thrombosis. No superficial thrombophlebitis. No popliteal cyst.  Nonocclusive DVT noted in the right common femoral vein. Patient has known DVT right leg.      Impression    IMPRESSION:  1.  No deep venous thrombosis in the left lower extremity.  2.  Nonocclusive thrombus right common femoral vein. Remainder of right leg not evaluated.   IR Pulmonary Angiogram Bilateral    Narrative    INTERVENTIONAL RADIOLOGY PULMONARY ANGIOGRAM BILATERAL August 19, 2022  at 2127 hours    HISTORY: 42-year-old patient with bilateral pulmonary emboli and right  lower extremity deep vein thrombosis, request made for mechanical  thrombectomy. Patient has had significant right lower extremity  swelling, also shortness of breath with exertion. Patient had CT exam  at an outside facility demonstrating a large central clot burden with  tachycardia. Otherwise, physiologically, patient relatively intact.  However, suspect this is related to the fact that the clot is a saddle  embolus and relatively central with perfused branches. However,  if/when thrombus were to break and spread distally, patient would  certainly become more symptomatic. Plan is for mechanical thrombectomy  due to overall amount of clot.    TECHNIQUE: Patient was brought to the interventional radiology  department and informed consent obtained. Patient was placed in a  supine  position. Both groins were prepped and draped in standard  sterile fashion. Ultrasound was used to visualize both common femoral  veins and both found to be patent. Image stored for documentation from  each common femoral vein. After 1% lidocaine administration, a  micropuncture kit was used to access the right common femoral vein.  Patient had known DVT in the right lower extremity though proximal  extent not entirely clear. On my ultrasound assessment, the area vein  that was accessed was patent and over a series of maneuvers, a 6  Greenlandic vascular sheath was placed. A venogram was then performed from  the common femoral vein demonstrating patency of the iliac venous  system and IVC. These vein segments were patent.    An angled pigtail catheter was then advanced to the heart and into the  pulmonary arterial system. Pulmonary angiogram was performed. A  [employer] wire was then placed and attempts were made to access the  right pulmonary arterial system with a Berenstein catheter, though the  angle was not conducive to passage of catheter. Therefore a C2  catheter was used to get into the right pulmonary arterial system.  Catheter was placed in a peripheral lower lobe branch and a Super  Stiff 1 cm floppy tip Amplatz wire was placed. 24 Greenlandic Inari sheath  was placed over the super stiff wire and into the IVC. A 24 Greenlandic  FlowTriever was then advanced. The FlowTriever was advanced to the  level of the heart under fluoroscopic guidance where mild resistance  was met. Given the possibility of passing through/between valves  chordee, all access to the pulmonary arterial system was removed and  pulled back to the IVC. A 90 degree pigtail catheter was then used to  advance through the tricuspid valve and into the pulmonary arterial  system. Again, a C2 catheter was required to obtain access to the  right pulmonary arterial system and superstiff Amplatz wire was  placed. The FlowTriever was then able to pass through the  heart  without any resistance over the Amplatz wire. The FlowTriever was  placed in the lower lobe right pulmonary arterial branch and suction  initiated. There is essentially no blood return into the catheter,  therefore the catheter was pulled back through the main pulmonary  artery, right heart, and into the IVC. Venogram was performed from the  sheath demonstrating thrombus extending from the tip of the catheter.    Therefore, at this point, it was determined protection was needed in  the venous system therefore access obtained in the left common femoral  vein with same sequence with 1% lidocaine, a micropuncture kit, and  placement of a 12 Liberian sheath. An Inari filter device was then  placed in the IVC and the FlowTriever was then pulled back into the  sheath where large amount of thrombus was noted throughout the  FlowTriever. Once this thrombus was accounted for and the sheath, the  FlowTriever were flushed and confirm no residual thrombus. Venogram  was performed through the sheath demonstrating patency of the IVC  including at the level of the discs in the IVC. Over the superstiff  Amplatz wire, the FlowTriever was then advanced to the right pulmonary  arterial system where pulmonary angiogram was performed. Wire and  FlowTriever were then advanced into the left pulmonary arterial system  where pulmonary angiogram was performed demonstrating wide patency  throughout the pulmonary arterial system without visible residual  thrombus. There may be trace residual in a left lower lobe branch,  though nonobstructive. The FlowTriever was then removed.    Through the 24 Liberian sheath, a Bard KAREN IVC filter catheter was  placed and venogram performed in the IVC demonstrating wide patency. A  Bard KAREN retrievable IVC filter was then deployed in an infrarenal  location. Completion venogram was performed demonstrating appropriate  position. Both the right groin 24 Liberian sheath and left groin 24  Liberian sheath  were removed and pursestring sutures applied. Patient  tolerated the procedure well.    Of note, preprocedure pulmonary arterial pressure was 26/10 with a map  of 19 and postprocedure pressures were 29/5 with map of 12.    Sedation: A moderate level of sedation was achieved with 4 mg IV  Versed and 20 mcg IV fentanyl. An additional 1000 units of heparin was  administered as an intravenous bolus. At 8:00 PM, AC3 was drawn and  found to be 233. At 8:30 PM AC3 was drawn and found to be 237, this  was when 1000 units of heparin was administered.  Sedation time: 110 minutes.  Please note the above medications were administered by the  interventional radiology staff under my direct supervision. Patient's  vital signs were monitored and remained stable throughout the  procedure.  Fluoroscopic time: 20.8 minutes.  Air Kerma: 956 mGy.  Contrast: 148 mL of Isovue-300 administered intravenously without  complication.  Local anesthetic: 20 mL of 1% lidocaine.    FINDINGS: Total of nine spot fluoroscopic images and venogram  sequences obtained throughout the procedure. Initial venogram is from  right common femoral vein access site demonstrating patency throughout  the right iliac venous system. Pulmonary angiogram was then performed  with a pigtail catheter demonstrating nonobstructing saddle pulmonary  embolus. After engaging the thrombus, FlowTriever was pulled back to  the IVC where large amount of thrombus was identified extending from  the FlowTriever, though relatively trapped given suction application  to the FlowTriever. Two discs of the filter device were then deployed  in the IVC and venogram performed demonstrating no thrombus at/below  the level of the discs. Completion venogram in each of the right and  left pulmonary arterial systems were then deployed demonstrating wide  patency throughout both pulmonary arteries. There may be trace  residual nonocclusive thrombus in left lower lobe branch. IVC venogram  then  performed demonstrating patency of the infrarenal IVC with  location of retrievable IVC filter.      Impression    IMPRESSION:  1. Successful mechanical thrombectomy of saddle pulmonary embolus with  removal of large clot burden. Embolus extended into right and left  pulmonary arterial systems. Will plan to continue heparin drip.  Bilateral groin pursestring sutures applied.  2. Successful placement of Bard KAREN retrievable IVC filter. Plan  will be for filter removal in two months.    LOVE COLBERT MD         SYSTEM ID:  [id number]   IR IVC Filter Placement    Narrative    INTERVENTIONAL RADIOLOGY PULMONARY ANGIOGRAM BILATERAL August 19, 2022  at 2127 hours    HISTORY: 42-year-old patient with bilateral pulmonary emboli and right  lower extremity deep vein thrombosis, request made for mechanical  thrombectomy. Patient has had significant right lower extremity  swelling, also shortness of breath with exertion. Patient had CT exam  at an outside facility demonstrating a large central clot burden with  tachycardia. Otherwise, physiologically, patient relatively intact.  However, suspect this is related to the fact that the clot is a saddle  embolus and relatively central with perfused branches. However,  if/when thrombus were to break and spread distally, patient would  certainly become more symptomatic. Plan is for mechanical thrombectomy  due to overall amount of clot.    TECHNIQUE: Patient was brought to the interventional radiology  department and informed consent obtained. Patient was placed in a  supine position. Both groins were prepped and draped in standard  sterile fashion. Ultrasound was used to visualize both common femoral  veins and both found to be patent. Image stored for documentation from  each common femoral vein. After 1% lidocaine administration, a  micropuncture kit was used to access the right common femoral vein.  Patient had known DVT in the right lower extremity though proximal  extent not  entirely clear. On my ultrasound assessment, the area vein  that was accessed was patent and over a series of maneuvers, a 6  Italian vascular sheath was placed. A venogram was then performed from  the common femoral vein demonstrating patency of the iliac venous  system and IVC. These vein segments were patent.    An angled pigtail catheter was then advanced to the heart and into the  pulmonary arterial system. Pulmonary angiogram was performed. A  Bentson wire was then placed and attempts were made to access the  right pulmonary arterial system with a Berenstein catheter, though the  angle was not conducive to passage of catheter. Therefore a C2  catheter was used to get into the right pulmonary arterial system.  Catheter was placed in a peripheral lower lobe branch and a Super  Stiff 1 cm floppy tip Amplatz wire was placed. 24 Italian Inari sheath  was placed over the super stiff wire and into the IVC. A 24 Italian  FlowTriever was then advanced. The FlowTriever was advanced to the  level of the heart under fluoroscopic guidance where mild resistance  was met. Given the possibility of passing through/between valves  chordee, all access to the pulmonary arterial system was removed and  pulled back to the IVC. A 90 degree pigtail catheter was then used to  advance through the tricuspid valve and into the pulmonary arterial  system. Again, a C2 catheter was required to obtain access to the  right pulmonary arterial system and superstiff Amplatz wire was  placed. The FlowTriever was then able to pass through the heart  without any resistance over the Amplatz wire. The FlowTriever was  placed in the lower lobe right pulmonary arterial branch and suction  initiated. There is essentially no blood return into the catheter,  therefore the catheter was pulled back through the main pulmonary  artery, right heart, and into the IVC. Venogram was performed from the  sheath demonstrating thrombus extending from the tip of the  catheter.    Therefore, at this point, it was determined protection was needed in  the venous system therefore access obtained in the left common femoral  vein with same sequence with 1% lidocaine, a micropuncture kit, and  placement of a 12 Rwandan sheath. An Inari filter device was then  placed in the IVC and the FlowTriever was then pulled back into the  sheath where large amount of thrombus was noted throughout the  FlowTriever. Once this thrombus was accounted for and the sheath, the  FlowTriever were flushed and confirm no residual thrombus. Venogram  was performed through the sheath demonstrating patency of the IVC  including at the level of the discs in the IVC. Over the superstiff  Amplatz wire, the FlowTriever was then advanced to the right pulmonary  arterial system where pulmonary angiogram was performed. Wire and  FlowTriever were then advanced into the left pulmonary arterial system  where pulmonary angiogram was performed demonstrating wide patency  throughout the pulmonary arterial system without visible residual  thrombus. There may be trace residual in a left lower lobe branch,  though nonobstructive. The FlowTriever was then removed.    Through the 24 R[place]an sheath, a Bard KAREN IVC filter catheter was  placed and venogram performed in the IVC demonstrating wide patency. A  Bard KAREN retrievable IVC filter was then deployed in an infrarenal  location. Completion venogram was performed demonstrating appropriate  position. Both the right groin 24 Rwandan sheath and left groin 24  Rwandan sheath were removed and pursestring sutures applied. Patient  tolerated the procedure well.    Of note, preprocedure pulmonary arterial pressure was 26/10 with a map  of 19 and postprocedure pressures were 29/5 with map of 12.    Sedation: A moderate level of sedation was achieved with 4 mg IV  Versed and 20 mcg IV fentanyl. An additional 1000 units of heparin was  administered as an intravenous bolus. At 8:00 PM,  AC3 was drawn and  found to be 233. At 8:30 PM AC3 was drawn and found to be 237, this  was when 1000 units of heparin was administered.  Sedation time: 110 minutes.  Please note the above medications were administered by the  interventional radiology staff under my direct supervision. Patient's  vital signs were monitored and remained stable throughout the  procedure.  Fluoroscopic time: 20.8 minutes.  Air Kerma: 956 mGy.  Contrast: 148 mL of Isovue-300 administered intravenously without  complication.  Local anesthetic: 20 mL of 1% lidocaine.    FINDINGS: Total of nine spot fluoroscopic images and venogram  sequences obtained throughout the procedure. Initial venogram is from  right common femoral vein access site demonstrating patency throughout  the right iliac venous system. Pulmonary angiogram was then performed  with a pigtail catheter demonstrating nonobstructing saddle pulmonary  embolus. After engaging the thrombus, FlowTriever was pulled back to  the IVC where large amount of thrombus was identified extending from  the FlowTriever, though relatively trapped given suction application  to the FlowTriever. Two discs of the filter device were then deployed  in the IVC and venogram performed demonstrating no thrombus at/below  the level of the discs. Completion venogram in each of the right and  left pulmonary arterial systems were then deployed demonstrating wide  patency throughout both pulmonary arteries. There may be trace  residual nonocclusive thrombus in left lower lobe branch. IVC venogram  then performed demonstrating patency of the infrarenal IVC with  location of retrievable IVC filter.      Impression    IMPRESSION:  1. Successful mechanical thrombectomy of saddle pulmonary embolus with  removal of large clot burden. Embolus extended into right and left  pulmonary arterial systems. Will plan to continue heparin drip.  Bilateral groin pursestring sutures applied.  2. Successful placement of Bard  KAREN retrievable IVC filter. Plan  will be for filter removal in two months.    LOVE COLBERT MD         SYSTEM ID:  [id number]   US Abdomen Complete Portable    Narrative    EXAM: US ABDOMEN LIMITED PORTABLE  LOCATION: [facility]  DATE/TIME: 2022 11:02 AM    INDICATION: Abdominal distention. Ascites check.  COMPARISON: Chest CTA 2022 and ultrasound 2022  TECHNIQUE: Complete abdominal ultrasound.    FINDINGS:    Scans of the 4 quadrants demonstrate moderate anechoic ascites. Findings similar to chest CTA 2022 and new from ultrasound 2022.       Echocardiogram Complete     Value    LVEF  60-65%    Narrative    [id number]  [facility]  [id number]  [id number]^BRITNEY^KASI^CHARLENE     [facility]  Echocardiography Laboratory  [address] [place] [address]     Name: NICK HENDERSON  MRN: [id number]  : 1980  Study Date: 2022 08:49 AM  Age: 42 yrs  Gender: Male  Patient Location: [facility]  Reason For Study: Pulmonary Embolism  Ordering Physician: KASI TAN  Referring Physician: Tiago Galeana  Performed By: Treasure Davidson RDCS     BSA: 2.7 m2  Height: 74 in  Weight: 327 lb  HR: 111  BP: 143/93 mmHg  ______________________________________________________________________________  Procedure  Complete Echo Adult. Optison (NDC #[id number]) given intravenously. Technically  difficult study.  ______________________________________________________________________________  Interpretation Summary     The rhythm was sinus tachycardia.  The visual ejection fraction is 60-65%.  The right ventricle is normal in size and function.  Right ventricular systolic pressure could not be approximated due to  inadequate tricuspid regurgitation.  Dilated pulmonary arteries. Main PA measures 4 cms.  No significant valve disease.  There is no pericardial effusion.  Technically difficult  study.  ______________________________________________________________________________  Left Ventricle  The left ventricle is normal in size. There is concentric remodeling present.  The left ventricular ejection fraction is normal. The visual ejection fraction  is 60-65%. No regional wall motion abnormalities noted.     Right Ventricle  The right ventricle is normal in size and function.     Atria  The left atrium is borderline dilated. Right atrial size is normal.     Mitral Valve  Thickened mitral valve posterior leaflet. There is moderate mitral annular  calcification. There is trace mitral regurgitation. There is no mitral valve  stenosis.     Tricuspid Valve  The tricuspid valve is not well visualized, but is grossly normal. Right  ventricular systolic pressure could not be approximated due to inadequate  tricuspid regurgitation. There is physiologic tricuspid regurgitation.     Aortic Valve  The aortic valve is trileaflet. No aortic regurgitation is present. No aortic  stenosis is present.     Pulmonic Valve  The pulmonic valve is not well visualized.     Vessels  The aortic root is normal size. Normal size ascending aorta. Dilated pulmonary  arteries. Main PA measures 4 cms.     Pericardium  There is no pericardial effusion.     Rhythm  The rhythm was sinus tachycardia.  ______________________________________________________________________________  MMode/2D Measurements & Calculations  IVSd: 1.4 cm     LVIDd: 4.2 cm  LVIDs: 2.7 cm  LVPWd: 1.2 cm  FS: 36.0 %  LV mass(C)d: 207.2 grams  LV mass(C)dI: 77.4 grams/m2  Ao root diam: 3.5 cm  LA dimension: 5.0 cm  asc Aorta Diam: 3.3 cm  LA/Ao: 1.4  LA Volume (BP): 79.4 ml  LA Volume Index (BP): 29.6 ml/m2  RWT: 0.57     Doppler Measurements & Calculations  PA acc time: 0.06 sec     ______________________________________________________________________________  Report approved by: Gianna Hernandez 08/19/2022 09:56 AM               Discharge Medications   Current  Discharge Medication List      START taking these medications    Details   enoxaparin ANTICOAGULANT (LOVENOX) 120 MG/0.8ML syringe Inject 0.7 mLs (105 mg) Subcutaneous every 12 hours Continue until INR over 2 for 24 hours then STOP. Refill as needed as instructed by coumadin clinic.  Qty: 8 mL, Refills: 0    Associated Diagnoses: Acute saddle pulmonary embolism without acute cor pulmonale (H)      insulin glargine (BASAGLAR KWIKPEN) 100 UNIT/ML pen Inject 5 Units Subcutaneous every morning  Qty: 15 mL, Refills: 0    Comments: If Basaglar is not covered by insurance, may substitute Lantus or Semglee or other insulin glargine product per insurance preference at same dose and frequency.    Associated Diagnoses: Type 2 diabetes mellitus with other specified complication, unspecified whether long term insulin use (H)      warfarin ANTICOAGULANT (COUMADIN) 2.5 MG tablet Take 5 mg tonight and then as instructed by coumadin clinic. You should have labs in the morning on 8/24.  Qty: 100 tablet, Refills: 0    Associated Diagnoses: Acute saddle pulmonary embolism without acute cor pulmonale (H)         CONTINUE these medications which have NOT CHANGED    Details   furosemide (LASIX) 40 MG tablet Take 40 mg by mouth every morning      lactulose (CHRONULAC) 10 GM/15ML solution Take 30 g by mouth 3 times daily      spironolactone (ALDACTONE) 100 MG tablet Take 100 mg by mouth every morning         STOP taking these medications       metFORMIN (GLUCOPHAGE) 500 MG tablet Comments:   Reason for Stopping:             Allergies   Allergies   Allergen Reactions     Amoxicillin Hives     Per patient

## 2022-08-23 NOTE — CONSULTS
"Consult received \"starting coumadin, please  on diet on coumadin\"  - Stopped in to provide handout: Vitamin K and Medications. Reviewed guidelines for eating Vit K while on coumadin, pt receptive. He has no questions at this time. Encouraged consistent intake of Vitamin K.     - See full ed not dated 8/22 for 2g Na and DM ed.     Korin Starr RD, LD  [facility], Ortho, Ortho Spine  Pager: [phone]  Weekend Pager: [phone]    "

## 2022-08-23 NOTE — PROGRESS NOTES
Interventional Radiology Progress Note:  Inpatient at [facility]  Date: August 23, 2022     History: Jareth Limon is a 42 year old male with a past medical history of hypertension, alcoholic liver cirrhosis with ascites who presented to hospital with right lower extremity swelling and pain found to have submassive PE without right heart strain and right lower extremity DVT.     Anil is s/p pulmonary thrombectomy and IVC filter placement on Friday 8/19/22.     IMPRESSION:  1. Successful mechanical thrombectomy of saddle pulmonary embolus with  removal of large clot burden. Embolus extended into right and left pulmonary arterial systems. Will plan to continue heparin drip. Bilateral groin pursestring sutures applied.  2. Successful placement of Bard KAREN retrievable IVC filter. Plan  will be for filter removal in two months.    Jareth is being seen in IR follow up today.     Interval History: Feeling ok. Breathing is much better. Right leg is less swollen and pain is much improved. Still some pain back of leg when I walk.     Physical Exam:   Vitals:Temp:  [97.8  F (36.6  C)-98  F (36.7  C)] 97.8  F (36.6  C)  Pulse:  [] 102  Resp:  [18] 18  BP: (102-124)/(63-84) 102/65  SpO2:  [91 %-98 %] 91 %    General: Quiet, cooperative male, lying in bed with father in room, in no acute distress.    Neuro:  A&O x 3. Moves all extremities equally.  Resp:  Normal respirations on room air. Non labored breathing. Equal air entry B/L   Abdomen:  Soft, distended, non-tender.  Vascular: Right and Left groin procedure site with dressing CDI. Site soft without tenderness. Purple bruising around site bilateral, R > L    Labs:  Recent Labs   Lab 08/21/22  0559 08/19/22  0751 08/18/22  2251   HGB 13.7 14.3 15.4   WBC 6.5 6.5 7.3     Recent Labs   Lab 08/23/22  0949 08/21/22  0559 08/19/22  0751   CR 0.69 0.73 0.61*      Recent Labs   Lab 08/23/22  0949 08/21/22  0559 08/18/22  2251   PROTTOTAL 5.4* 5.1* 5.7*    ALBUMIN 2.2* 2.1* 2.3*   BILITOTAL 3.0* 2.3* 3.1*   ALKPHOS 92 90 100   AST 34 37 47*   ALT 21 24 30     Cultures:  No results for input(s): CULT in the last 168 hours.    Assessment: Jareth Limon is a 42 year old male with a past medical history of hypertension, alcoholic liver cirrhosis with ascites who presented to hospital with right lower extremity swelling and pain found to have submassive PE without right heart strain and right lower extremity DVT.    Doing and feeling much better. Ready to go home today.     Plan: Will follow up with Jareth in 1 month with an US. Since he is 1-2 hours away RN Clinician [place].   -Will need to get the filter out in ~ 2 months which IR RN Clinician will keep track of.     Total time spent on the date of the encounter is 20minutes, including time spent counseling the patient, performing a medically appropriate evaluation, reviewing prior medical history, ordering medications and tests, documenting clinical information in the medical record, and communication of results.    Thanks [facility] Interventional Radiology CNP ([phone]) (phone [phone])

## 2022-08-23 NOTE — PLAN OF CARE
Goal Outcome Evaluation:    Summary:     DATE & TIME: 8/22-23/22 8501-7911  Cognitive Concerns/ Orientation : A&Ox4   BEHAVIOR & AGGRESSION TOOL COLOR: green  CIWA SCORE: na   ABNL VS/O2: na  MOBILITY: Independent  PAIN MANAGMENT: na  DIET: high carb and low sodium  BOWEL/BLADDER: continent  ABNL LAB/BG: bili 3.1  DRAIN/DEVICES: na  TELEMETRY RHYTHM: Sinus Tach  SKIN: +3/4 edema in R ankle/foot.  TESTS/PROCEDURES:   D/C DAY/GOALS/PLACE: Possibly 8/23 to home with dad  OTHER IMPORTANT INFO: chem dep consult complete, agreeing to inpatient tx

## 2022-08-23 NOTE — PROGRESS NOTES
Discharge    Patient discharged to home via personal transport with father      Listed belongings gathered and given to patient (including from security/pharmacy). Yes  Care Plan and Patient education resolved: Yes  Prescriptions if needed, hard copies sent with patient  NA  Medication Bin checked and emptied on discharge Yes  SW/care coordinator/charge RN aware of discharge: Yes      Camilo Garcia RN,

## 2022-08-24 NOTE — CONSULTS
Consult Date: 08/23/2022    REASON FOR CONSULTATION:  Alcohol abuse.    REFERRING PHYSICIAN:  Miri Mayer MD    HISTORY OF PRESENT ILLNESS:  Mr. Jareth Limon is a very pleasant 42-year-old gentleman with history of hypertension, alcohol liver cirrhosis with ascites, presented to the hospital with right lower extremity swelling and pain.  Had pulmonary embolism with right heart strain and right lower extremity DVT.  The patient is status post bilateral thrombectomy on 08/19/2022 by interventional radiology.  It is also noted the patient has a history of alcohol liver cirrhosis, moderate ascites, class C, MELD score 18 on 08/19/2022.  Psychiatry is asked to assess in the setting of his alcohol use.    The patient does report he has had a history of alcohol problems, but does want to stay sober.  He is interested in going into treatment at [facility].  The patient denies that he is suffering from any depression, hopelessness, or suicidal thoughts.  He denies that he is having any concerns about anxiety, worry, excessive panic.  He denies any history of bipolar or nany symptomatology.  He denies any history of psychosis symptoms, including auditory or visual hallucinations or paranoid ideation.  He denies any history of OCD-type symptom concerns, prior ADHD, prior learning disorders, prior trauma, abuse, or posttraumatic stress disorder-type symptoms.    The patient is feeling improvement here in the hospital.  He has been sleeping well.  He is reporting good appetite.  He does describe hope in the future. He is denying any suicidal ideation or thoughts of harming others.    PAST PSYCHIATRIC HISTORY:  No prior psychiatric admissions or prior suicidal behavior.  Does not have a psychiatrist.  Does not have a therapist.  Reports he did try some antidepressants in the past and did not really like them.  He is not interested in trying any antidepressants at this time.    MEDICAL HISTORY:  Alcohol liver cirrhosis  with ascites, transaminitis, hyponatremia.    PRIOR TO ADMISSION MEDICATIONS:  Lisinopril.    ALLERGIES:  AMOXICILLIN.    SOCIAL HISTORY:  No drug use.  He has a history of alcohol abuse.  He has been a smoker.    FAMILY HISTORY:  Reviewed from the EMR.    REVIEW OF SYSTEMS:  A 10-point review of systems completed and negative other than described in HPI.    PHYSICAL EXAMINATION:  VITAL SIGNS:  Blood pressure 102/65, temperature 97.8, pulse 102, respiratory rate 18, SpO2 of 91%.  MENTAL STATUS EXAMINATION:  He is dressed in a hospital gown, lying down in bed.  He is obese.  He is tired because he was just woken up this morning, but able to participate in conversation easily.  He is fluent. No aphasia.  Speech is nonpressured.  No involuntary movements.  No tremulousness.  No agitation or aggression.  Mood is described as good.  Affect is neutral.  Thought form linear, logical, goal directed.  No flight of ideas.  Not responding to internal stimuli.  Denies perceptual disturbances.  No fluctuation in cognition or deficits in cognitive processing.  Short and long-term memory intact.  Attention and concentration intact.  Judgment and insight improving as he is recognizing the need for abstaining from alcohol and following up with medical recommendations.    IMPRESSION AND PLAN:  1.  Alcohol use disorder, severe.  2.  Alcohol liver disease.    FORMULATION:  This is a 42-year-old gentleman with history of alcohol use disorder, alcohol cirrhosis, who did present with submassive pulmonary embolus with right heart strain and right lower extremity deep venous thrombosis.  The patient is denying any acute psychiatric related concerns.  Denies any need for medication management.  He does feel like he needs to maintain a sober lifestyle and consider a residential treatment through [facility] as a future option.    RESULTS REVIEW:  Sodium 135, creatinine 0.69, alkaline phosphatase 92, protein total 5.4, albumin 2.2, glucose 130s.   INR 1.63.    RECOMMENDATIONS:  Agree with plan to continue follow up with [facility] to see if there are any treatment options that are available, including possibly residential or outpatient programs.  The patient does seem motivated to stay sober and follow through with treatment options.  No further psychiatric recommendations at this time.  The patient has declined any need for any psychiatric interventions otherwise.    RISK ASSESSMENT:  Risk of intentional harm to self or others is assessed to be low, as he is without suicidal thoughts or thoughts of harming others.  Denies any history about malbehavior toward self or others.  He is future oriented.  He is help seeking, is cooperative, and willing to crisis plan.    Edgardo Diamond MD        D: 2022   T: 2022   MT: zaina    Name:     NICK HENDERSON  MRN:      [id number]        Account:      [id number]   :      1980           Consult Date: 2022     Document: [id number]

## 2022-09-07 ENCOUNTER — TELEPHONE (OUTPATIENT)
Dept: OTHER | Facility: CLINIC | Age: 42
End: 2022-09-07

## 2022-09-07 NOTE — TELEPHONE ENCOUNTER
ANN for patient to call and schedule imaging and a follow-up appointment with Dr. Astorga on 10/10/22.    Appt. Note:  s/p right DVT, pulmonary thrombectomy and filter placement done on 8/19 with Dr. Astorga. 1 mo f/u

## 2022-09-13 NOTE — TELEPHONE ENCOUNTER
ANN to schedule.       Nellie Lopez, Surgery Scheduling   [facility]  [facility]

## 2022-09-16 NOTE — TELEPHONE ENCOUNTER
Patient did not respond to the 2 phone calls to schedule.  Will route to Dr. Sloan's nurse as an FYI.  No further attempts will be made to schedule patient.

## 2022-09-26 ENCOUNTER — TELEPHONE (OUTPATIENT)
Dept: OTHER | Facility: CLINIC | Age: 42
End: 2022-09-26

## 2022-10-11 NOTE — TELEPHONE ENCOUNTER
Pt scheduled as follow:  Future Appointments   Date Time Provider Department Center   11/7/2022  1:30 PM [id number]   11/7/2022  2:10 PM Anil Astorga MD [facility]       Pt also wondering about when IVC filter will be removed.

## 2022-10-12 NOTE — TELEPHONE ENCOUNTER
Imaging Received  October 14, 2022 2:44 PM ABT   Action: Images from [facility] received and resolved to PACS.     Imaging Received  October 13, 2022 4:25 PM ABT   Action: Images from [facility] received and resolved to PACS.     RECORDS STATUS - ALL OTHER DIAGNOSIS      RECORDS RECEIVED FROM: [facility], [place], [place], [facility]   DATE RECEIVED: 10/14/22   NOTES STATUS DETAILS   OFFICE NOTE from referring provider DYLAN Cordero   DISCHARGE SUMMARY from [facility] 08/18/22: [facility]   MEDICATION LIST [facility]    LABS     ANYTHING RELATED TO DIAGNOSIS [facility] Most recent 10/11/22   IMAGING (NEED IMAGES & REPORT)     CT SCANS PACS PACS:  08/18/22: CT PULM    [facility]:  09/23/16, 06/20/14, 05/31/11: CTA Chest    [facility]:  10/15/13: CT Chest   XRAYS PACS [facility]:  07/11/22, 09/15/15, 10/15/13: XR Chest    [facility]:  09/15/16, 11/18/14, 10/09/14, 06/12/14, 05/31/11: XR Chest   ULTRASOUND PACS [facility]:   11/13/13: US Chest

## 2022-10-19 ENCOUNTER — TELEPHONE (OUTPATIENT)
Dept: OTHER | Facility: CLINIC | Age: 42
End: 2022-10-19

## 2022-10-19 NOTE — TELEPHONE ENCOUNTER
Left message on VM.  Need to cancel consult with Dr. Astorga.  However, I do need him to come in for the ultrasound appointment.  I will review with IR and contact him with the results.  This will dictate timing of IVC filter removal.    Left message to call me back to discuss.  Magaly Farooq RN  IR nurse clinician  [phone]

## 2022-10-20 ENCOUNTER — TELEPHONE (OUTPATIENT)
Dept: OTHER | Facility: CLINIC | Age: 42
End: 2022-10-20

## 2022-10-20 NOTE — TELEPHONE ENCOUNTER
Contacted patient, left message on VM.  Need to cancel appt with Dr. Astorga,  Will still need imaging but will call with results.  Magaly Farooq RN  IR nurse clinician  [phone]

## 2022-10-26 ENCOUNTER — TELEPHONE (OUTPATIENT)
Dept: OTHER | Facility: CLINIC | Age: 42
End: 2022-10-26

## 2022-11-01 NOTE — TELEPHONE ENCOUNTER
[facility]ULAR [facility]    Who is the name of the provider?:  Gauri    What is the location you see this provider at/preferred location?: [place]  Person calling: Jareth Limon  Phone number:  [phone]  Nurse call back needed:  Not Applicable     Reason for call:     Patient would like to have his imaging order faxed to [facility] in [place].    Fax # [phone]        Pharmacy location:     Outside Imaging: Not Applicable   Can we leave a detailed message on this number?  YES

## 2022-11-02 NOTE — TELEPHONE ENCOUNTER
ANN for patient informing him we would fax the order and we will cancel the 11/7/22 imaging appointment at [facility].

## 2022-11-15 ENCOUNTER — TELEPHONE (OUTPATIENT)
Dept: OTHER | Facility: CLINIC | Age: 42
End: 2022-11-15

## 2022-11-15 NOTE — TELEPHONE ENCOUNTER
[facility]    Who is the name of the provider?:  Gauri    What is the location you see this provider at/preferred location?:    Person calling: Jareth Limon  Phone number:  [phone]  Nurse call back needed:  YES     Reason for call:     He had the imaging done at [facility] on 11/7/22.  He said someone from our office called him late last week.  He is calling to find out if we have the results.    Please review and call patient.      Pharmacy location:     Outside Imaging: YES   Can we leave a detailed message on this number?  YES

## 2022-11-16 ENCOUNTER — VIRTUAL VISIT (OUTPATIENT)
Dept: ONCOLOGY | Facility: CLINIC | Age: 42
End: 2022-11-16
Attending: INTERNAL MEDICINE
Payer: COMMERCIAL

## 2022-11-16 ENCOUNTER — PRE VISIT (OUTPATIENT)
Dept: ONCOLOGY | Facility: CLINIC | Age: 42
End: 2022-11-16

## 2022-11-16 DIAGNOSIS — I26.92 ACUTE SADDLE PULMONARY EMBOLISM WITHOUT ACUTE COR PULMONALE (H): ICD-10-CM

## 2022-11-16 PROCEDURE — 99204 OFFICE O/P NEW MOD 45 MIN: CPT | Mod: 95 | Performed by: INTERNAL MEDICINE

## 2022-11-16 PROCEDURE — G0463 HOSPITAL OUTPT CLINIC VISIT: HCPCS | Mod: PN,RTG | Performed by: INTERNAL MEDICINE

## 2022-11-16 NOTE — LETTER
11/16/2022         RE: Jareth Limon  [address]  [facility] [address]        Dear Colleague,    Thank you for referring your patient, Jareth Limon, to the [facility]. Please see a copy of my visit note below.    Jareth is a 42 year old who is being evaluated via a billable video visit.      How would you like to obtain your AVS? MyChart  If the video visit is dropped, the invitation should be resent by: Text to cell phone: [phone]  Will anyone else be joining your video visit? No      Chaz HERBERT    Video-Visit Details    Video Start Time: 1:52 PM    Type of service:  Video Visit    Video End Time:1:52 PM    Originating Location (pt. Location): Home        Distant Location (provider location):  On-site    Platform used for Video Visit: [facility]    Hematology/Oncology New Patient Note      Today's Date: 11/16/22    Reason for Consult: History of pulmonary embolus      HISTORY OF PRESENT ILLNESS: Mr. Limon is a 42-year-old gentleman with multiple medical problems including alcoholic liver cirrhosis and diabetes mellitus.  The patient was seen in an outside hospital because of right lower extremity pain and swelling.  Imaging studies revealed right lower extremity DVT and also pulmonary embolism.  The patient was transferred to [facility] for mechanical thrombectomy.     Lower extremity ultrasound done here and revealed nonocclusive thrombus in right common femoral vein.  He was evaluated by the interventional radiologist.  The patient had mechanical thrombectomy of a saddle pulmonary embolism and also placement of IVC filter.      He was hospitalized at the end of August.  He is currently on Coumadin tolerating it well.  INR stable.  Clinically feeling well.  He was also diagnosed withFactor V Leiden heterozygous mutation.  He does not have any children      REVIEW OF SYSTEMS:   14 point ROS was reviewed and is negative other  than as noted above in HPI.       HOME MEDICATIONS:  Current Outpatient Medications   Medication Sig Dispense Refill     furosemide (LASIX) 40 MG tablet Take 40 mg by mouth every morning       lactulose (CHRONULAC) 10 GM/15ML solution Take 30 g by mouth 3 times daily       spironolactone (ALDACTONE) 100 MG tablet Take 100 mg by mouth every morning       warfarin ANTICOAGULANT (COUMADIN) 2.5 MG tablet Take 5 mg tonight and then as instructed by coumadin clinic. You should have labs in the morning on 8/24. 100 tablet 0     enoxaparin ANTICOAGULANT (LOVENOX) 120 MG/0.8ML syringe Inject 0.7 mLs (105 mg) Subcutaneous every 12 hours Continue until Chr Factor 10: 20-40% for 24 hours then STOP. Refill as needed as instructed by coumadin clinic. (Patient not taking: Reported on 11/16/2022) 8 mL 0     insulin glargine (BASAGLAR KWIKPEN) 100 UNIT/ML pen Inject 5 Units Subcutaneous every morning (Patient not taking: Reported on 11/16/2022) 15 mL 0         ALLERGIES:  Allergies   Allergen Reactions     Amoxicillin Hives     Per patient         PAST MEDICAL HISTORY:  Past Medical History:   Diagnosis Date     Alcoholic cirrhosis of liver with ascites (H)      Diabetes mellitus, type 2 (H)      HTN (hypertension)      DAMIAN (obstructive sleep apnea)     uses cpap         PAST SURGICAL HISTORY:  Past Surgical History:   Procedure Laterality Date     GI SURGERY  1999    gall bladder removed     IR IVC FILTER PLACEMENT  8/19/2022     IR PULMONARY ANGIOGRAM BILATERAL  8/19/2022         SOCIAL HISTORY:  Social History     Socioeconomic History     Marital status: Single     Spouse name: Not on file     Number of children: Not on file     Years of education: Not on file     Highest education level: Not on file   Occupational History     Not on file   Tobacco Use     Smoking status: Every Day     Packs/day: 1.00     Years: 17.00     Pack years: 17.00     Types: Cigarettes     Smokeless tobacco: Current     Types: Chew   Substance and  Sexual Activity     Alcohol use: Yes     Comment: used 3 x a week now once a week     Drug use: No     Comment: past use of marihuana, cocaine, lsd     Sexual activity: Never   Other Topics Concern     Parent/sibling w/ CABG, MI or angioplasty before 65F 55M? Not Asked   Social History Narrative     Not on file     Social Determinants of Health     Financial Resource Strain: Not on file   Food Insecurity: Not on file   Transportation Needs: Not on file   Physical Activity: Not on file   Stress: Not on file   Social Connections: Not on file   Intimate Partner Violence: Not on file   Housing Stability: Not on file         FAMILY HISTORY:  Family History   Problem Relation Age of Onset     Diabetes Mother      Obesity Mother      Alcohol/Drug Father      Alcohol/Drug Maternal Grandmother      Cerebrovascular Disease Maternal Grandfather      Diabetes Maternal Grandfather      Alcohol/Drug Maternal Grandfather      Hearing Loss Maternal Grandfather      Alcohol/Drug Paternal Grandmother      Alcohol/Drug Paternal Grandfather      Asthma No family hx of      C.A.D. No family hx of      Hypertension No family hx of      Breast Cancer No family hx of      Cancer - colorectal No family hx of      Prostate Cancer No family hx of      Cancer No family hx of      Allergies No family hx of      Alzheimer Disease No family hx of      Anesthesia Reaction No family hx of      Arthritis No family hx of      Blood Disease No family hx of      Cardiovascular No family hx of      Circulatory No family hx of      Congenital Anomalies No family hx of      Connective Tissue Disorder No family hx of      Depression No family hx of      Endocrine Disease No family hx of      Eye Disorder No family hx of      Genetic Disorder No family hx of      Gastrointestinal Disease No family hx of      Genitourinary Problems No family hx of      Gynecology No family hx of      Heart Disease No family hx of      Lipids No family hx of       Musculoskeletal Disorder No family hx of      Neurologic Disorder No family hx of      Osteoporosis No family hx of      Psychotic Disorder No family hx of      Respiratory No family hx of      Thyroid Disease No family hx of          PHYSICAL EXAM:  Vital signs:  There were no vitals taken for this visit.   ECO  GENERAL/CONSTITUTIONAL: No acute distress. Healthy, alert.  EYES: No scleral icterus.  No redness or discharge.    RESPIRATORY: No audible wheeze, cough, or visible cyanosis.  No visible retractions or increased work of breathing.  Able to speak fully in complete sentences.  MUSCULOSKELETAL: Normal range of motion.  NEUROLOGIC: Alert, oriented, answers questions appropriately. No tremor. Mentation intact and speech normal  INTEGUMENTARY: No jaundice.  No obvious rash or skin lesions.  PSYCHIATRIC:  Mentation appears normal, affect normal/bright, judgement and insight intact, normal speech and appearance well-groomed.    The rest of a comprehensive physical exam is deferred due to public [facility] emergency video visit restrictions.    LABS:  CBC RESULTS:   Recent Labs   Lab Test 22  0949 22  0559   WBC  --  6.5   RBC  --  4.18*   HGB  --  13.7   HCT  --  40.4   MCV  --  97   MCH  --  32.8   MCHC  --  33.9   RDW  --  12.6   * 125*       Recent Labs   Lab Test 22  1238 22  0949 22  0736 22  0559   NA  --  135  --  136   POTASSIUM  --  3.9  --  4.0   CHLORIDE  --  105  --  106   CO2  --  24  --  25   ANIONGAP  --  6  --  5   * 134*   < > 143*   BUN  --  7  --  10   CR  --  0.69  --  0.73   SHERI  --  8.5  --  8.2*    < > = values in this interval not displayed.         PATHOLOGY:    na  IMAGING:  na    ASSESSMENT/PLAN:  Jareth Limon is a 42 year old male with history of DVT and PE now on coumadin with therapeutic INR    He Also has Factor V leiden heterozygous mutation       Previously we discussed lifelong anticoagulation and that is what I would recommend. He  is doing well on the coumadin. We can get the IVC filter removed    He does have evidence of heterozygous factor V Leiden mutation however he does not have any children therefore this information may be trivial    1 DVT/PE lifelong anticoagulation unless problems with bleeding then we can readdress  2.  Removal of IVC filter  3.  See us back as needed          Lit Webb MD  Hematology/Oncology  [facility]      Again, thank you for allowing me to participate in the care of your patient.        Sincerely,        Lit Webb MD

## 2022-11-16 NOTE — PROGRESS NOTES
[facility]    Hematology/Oncology New Patient Note      Today's Date: 11/16/22    Reason for Consult: History of pulmonary embolus      HISTORY OF PRESENT ILLNESS: Mr. Limon is a 42-year-old gentleman with multiple medical problems including alcoholic liver cirrhosis and diabetes mellitus.  The patient was seen in an outside hospital because of right lower extremity pain and swelling.  Imaging studies revealed right lower extremity DVT and also pulmonary embolism.  The patient was transferred to [facility] for mechanical thrombectomy.     Lower extremity ultrasound done here and revealed nonocclusive thrombus in right common femoral vein.  He was evaluated by the interventional radiologist.  The patient had mechanical thrombectomy of a saddle pulmonary embolism and also placement of IVC filter.      He was hospitalized at the end of August.  He is currently on Coumadin tolerating it well.  INR stable.  Clinically feeling well.  He was also diagnosed withFactor V Leiden heterozygous mutation.  He does not have any children      REVIEW OF SYSTEMS:   14 point ROS was reviewed and is negative other than as noted above in HPI.       HOME MEDICATIONS:  Current Outpatient Medications   Medication Sig Dispense Refill     furosemide (LASIX) 40 MG tablet Take 40 mg by mouth every morning       lactulose (CHRONULAC) 10 GM/15ML solution Take 30 g by mouth 3 times daily       spironolactone (ALDACTONE) 100 MG tablet Take 100 mg by mouth every morning       warfarin ANTICOAGULANT (COUMADIN) 2.5 MG tablet Take 5 mg tonight and then as instructed by coumadin clinic. You should have labs in the morning on 8/24. 100 tablet 0     enoxaparin ANTICOAGULANT (LOVENOX) 120 MG/0.8ML syringe Inject 0.7 mLs (105 mg) Subcutaneous every 12 hours Continue until Chr Factor 10: 20-40% for 24 hours then STOP. Refill as needed as instructed by coumadin clinic. (Patient not taking: Reported on 11/16/2022) 8 mL  0     insulin glargine (BASAGLAR KWIKPEN) 100 UNIT/ML pen Inject 5 Units Subcutaneous every morning (Patient not taking: Reported on 11/16/2022) 15 mL 0         ALLERGIES:  Allergies   Allergen Reactions     Amoxicillin Hives     Per patient         PAST MEDICAL HISTORY:  Past Medical History:   Diagnosis Date     Alcoholic cirrhosis of liver with ascites (H)      Diabetes mellitus, type 2 (H)      HTN (hypertension)      DAMIAN (obstructive sleep apnea)     uses cpap         PAST SURGICAL HISTORY:  Past Surgical History:   Procedure Laterality Date     GI SURGERY  1999    gall bladder removed     IR IVC FILTER PLACEMENT  8/19/2022     IR PULMONARY ANGIOGRAM BILATERAL  8/19/2022         SOCIAL HISTORY:  Social History     Socioeconomic History     Marital status: Single     Spouse name: Not on file     Number of children: Not on file     Years of education: Not on file     Highest education level: Not on file   Occupational History     Not on file   Tobacco Use     Smoking status: Every Day     Packs/day: 1.00     Years: 17.00     Pack years: 17.00     Types: Cigarettes     Smokeless tobacco: Current     Types: Chew   Substance and Sexual Activity     Alcohol use: Yes     Comment: used 3 x a week now once a week     Drug use: No     Comment: past use of marihuana, cocaine, lsd     Sexual activity: Never   Other Topics Concern     Parent/sibling w/ CABG, MI or angioplasty before 65F 55M? Not Asked   Social History Narrative     Not on file     Social Determinants of Health     Financial Resource Strain: Not on file   Food Insecurity: Not on file   Transportation Needs: Not on file   Physical Activity: Not on file   Stress: Not on file   Social Connections: Not on file   Intimate Partner Violence: Not on file   Housing Stability: Not on file         FAMILY HISTORY:  Family History   Problem Relation Age of Onset     Diabetes Mother      Obesity Mother      Alcohol/Drug Father      Alcohol/Drug Maternal Grandmother       Cerebrovascular Disease Maternal Grandfather      Diabetes Maternal Grandfather      Alcohol/Drug Maternal Grandfather      Hearing Loss Maternal Grandfather      Alcohol/Drug Paternal Grandmother      Alcohol/Drug Paternal Grandfather      Asthma No family hx of      C.A.D. No family hx of      Hypertension No family hx of      Breast Cancer No family hx of      Cancer - colorectal No family hx of      Prostate Cancer No family hx of      Cancer No family hx of      Allergies No family hx of      Alzheimer Disease No family hx of      Anesthesia Reaction No family hx of      Arthritis No family hx of      Blood Disease No family hx of      Cardiovascular No family hx of      Circulatory No family hx of      Congenital Anomalies No family hx of      Connective Tissue Disorder No family hx of      Depression No family hx of      Endocrine Disease No family hx of      Eye Disorder No family hx of      Genetic Disorder No family hx of      Gastrointestinal Disease No family hx of      Genitourinary Problems No family hx of      Gynecology No family hx of      Heart Disease No family hx of      Lipids No family hx of      Musculoskeletal Disorder No family hx of      Neurologic Disorder No family hx of      Osteoporosis No family hx of      Psychotic Disorder No family hx of      Respiratory No family hx of      Thyroid Disease No family hx of          PHYSICAL EXAM:  Vital signs:  There were no vitals taken for this visit.   ECO  GENERAL/CONSTITUTIONAL: No acute distress. Healthy, alert.  EYES: No scleral icterus.  No redness or discharge.    RESPIRATORY: No audible wheeze, cough, or visible cyanosis.  No visible retractions or increased work of breathing.  Able to speak fully in complete sentences.  MUSCULOSKELETAL: Normal range of motion.  NEUROLOGIC: Alert, oriented, answers questions appropriately. No tremor. Mentation intact and speech normal  INTEGUMENTARY: No jaundice.  No obvious rash or skin  lesions.  PSYCHIATRIC:  Mentation appears normal, affect normal/bright, judgement and insight intact, normal speech and appearance well-groomed.    The rest of a comprehensive physical exam is deferred due to public health emergency video visit restrictions.    LABS:  CBC RESULTS:   Recent Labs   Lab Test 08/23/22  0949 08/21/22  0559   WBC  --  6.5   RBC  --  4.18*   HGB  --  13.7   HCT  --  40.4   MCV  --  97   MCH  --  32.8   MCHC  --  33.9   RDW  --  12.6   * 125*       Recent Labs   Lab Test 08/23/22  1238 08/23/22  0949 08/21/22  0736 08/21/22  0559   NA  --  135  --  136   POTASSIUM  --  3.9  --  4.0   CHLORIDE  --  105  --  106   CO2  --  24  --  25   ANIONGAP  --  6  --  5   * 134*   < > 143*   BUN  --  7  --  10   CR  --  0.69  --  0.73   SHERI  --  8.5  --  8.2*    < > = values in this interval not displayed.         PATHOLOGY:    na  IMAGING:  na    ASSESSMENT/PLAN:  Jareth Limon is a 42 year old male with history of DVT and PE now on coumadin with therapeutic INR    He Also has Factor V leiden heterozygous mutation       Previously we discussed lifelong anticoagulation and that is what I would recommend. He is doing well on the coumadin. We can get the IVC filter removed    He does have evidence of heterozygous factor V Leiden mutation however he does not have any children therefore this information may be trivial    1 DVT/PE lifelong anticoagulation unless problems with bleeding then we can readdress  2.  Removal of IVC filter  3.  See us back as needed          Lit Webb MD  Hematology/Oncology  [facility]

## 2022-11-16 NOTE — LETTER
11/16/2022         RE: Jareth Limon  [address]  [facility] [address]        Dear Colleague,    Thank you for referring your patient, Jareth Limon, to the [facility]. Please see a copy of my visit note below.    Jareth is a 42 year old who is being evaluated via a billable video visit.      How would you like to obtain your AVS? MyChart  If the video visit is dropped, the invitation should be resent by: Text to cell phone: [phone]  Will anyone else be joining your video visit? No      Chaz HERBERT    Video-Visit Details    Video Start Time: 1:52 PM    Type of service:  Video Visit    Video End Time:1:52 PM    Originating Location (pt. Location): Home        Distant Location (provider location):  On-site    Platform used for Video Visit: [facility]    Hematology/Oncology New Patient Note      Today's Date: 11/16/22    Reason for Consult: History of pulmonary embolus      HISTORY OF PRESENT ILLNESS: Mr. Limon is a 42-year-old gentleman with multiple medical problems including alcoholic liver cirrhosis and diabetes mellitus.  The patient was seen in an outside hospital because of right lower extremity pain and swelling.  Imaging studies revealed right lower extremity DVT and also pulmonary embolism.  The patient was transferred to [facility] for mechanical thrombectomy.     Lower extremity ultrasound done here and revealed nonocclusive thrombus in right common femoral vein.  He was evaluated by the interventional radiologist.  The patient had mechanical thrombectomy of a saddle pulmonary embolism and also placement of IVC filter.      He was hospitalized at the end of August.  He is currently on Coumadin tolerating it well.  INR stable.  Clinically feeling well.  He was also diagnosed withFactor V Leiden heterozygous mutation.  He does not have any children      REVIEW OF SYSTEMS:   14 point ROS was reviewed and is negative other  than as noted above in HPI.       HOME MEDICATIONS:  Current Outpatient Medications   Medication Sig Dispense Refill     furosemide (LASIX) 40 MG tablet Take 40 mg by mouth every morning       lactulose (CHRONULAC) 10 GM/15ML solution Take 30 g by mouth 3 times daily       spironolactone (ALDACTONE) 100 MG tablet Take 100 mg by mouth every morning       warfarin ANTICOAGULANT (COUMADIN) 2.5 MG tablet Take 5 mg tonight and then as instructed by coumadin clinic. You should have labs in the morning on 8/24. 100 tablet 0     enoxaparin ANTICOAGULANT (LOVENOX) 120 MG/0.8ML syringe Inject 0.7 mLs (105 mg) Subcutaneous every 12 hours Continue until Chr Factor 10: 20-40% for 24 hours then STOP. Refill as needed as instructed by coumadin clinic. (Patient not taking: Reported on 11/16/2022) 8 mL 0     insulin glargine (BASAGLAR KWIKPEN) 100 UNIT/ML pen Inject 5 Units Subcutaneous every morning (Patient not taking: Reported on 11/16/2022) 15 mL 0         ALLERGIES:  Allergies   Allergen Reactions     Amoxicillin Hives     Per patient         PAST MEDICAL HISTORY:  Past Medical History:   Diagnosis Date     Alcoholic cirrhosis of liver with ascites (H)      Diabetes mellitus, type 2 (H)      HTN (hypertension)      DAMIAN (obstructive sleep apnea)     uses cpap         PAST SURGICAL HISTORY:  Past Surgical History:   Procedure Laterality Date     GI SURGERY  1999    gall bladder removed     IR IVC FILTER PLACEMENT  8/19/2022     IR PULMONARY ANGIOGRAM BILATERAL  8/19/2022         SOCIAL HISTORY:  Social History     Socioeconomic History     Marital status: Single     Spouse name: Not on file     Number of children: Not on file     Years of education: Not on file     Highest education level: Not on file   Occupational History     Not on file   Tobacco Use     Smoking status: Every Day     Packs/day: 1.00     Years: 17.00     Pack years: 17.00     Types: Cigarettes     Smokeless tobacco: Current     Types: Chew   Substance and  Sexual Activity     Alcohol use: Yes     Comment: used 3 x a week now once a week     Drug use: No     Comment: past use of marihuana, cocaine, lsd     Sexual activity: Never   Other Topics Concern     Parent/sibling w/ CABG, MI or angioplasty before 65F 55M? Not Asked   Social History Narrative     Not on file     Social Determinants of Health     Financial Resource Strain: Not on file   Food Insecurity: Not on file   Transportation Needs: Not on file   Physical Activity: Not on file   Stress: Not on file   Social Connections: Not on file   Intimate Partner Violence: Not on file   Housing Stability: Not on file         FAMILY HISTORY:  Family History   Problem Relation Age of Onset     Diabetes Mother      Obesity Mother      Alcohol/Drug Father      Alcohol/Drug Maternal Grandmother      Cerebrovascular Disease Maternal Grandfather      Diabetes Maternal Grandfather      Alcohol/Drug Maternal Grandfather      Hearing Loss Maternal Grandfather      Alcohol/Drug Paternal Grandmother      Alcohol/Drug Paternal Grandfather      Asthma No family hx of      C.A.D. No family hx of      Hypertension No family hx of      Breast Cancer No family hx of      Cancer - colorectal No family hx of      Prostate Cancer No family hx of      Cancer No family hx of      Allergies No family hx of      Alzheimer Disease No family hx of      Anesthesia Reaction No family hx of      Arthritis No family hx of      Blood Disease No family hx of      Cardiovascular No family hx of      Circulatory No family hx of      Congenital Anomalies No family hx of      Connective Tissue Disorder No family hx of      Depression No family hx of      Endocrine Disease No family hx of      Eye Disorder No family hx of      Genetic Disorder No family hx of      Gastrointestinal Disease No family hx of      Genitourinary Problems No family hx of      Gynecology No family hx of      Heart Disease No family hx of      Lipids No family hx of       Musculoskeletal Disorder No family hx of      Neurologic Disorder No family hx of      Osteoporosis No family hx of      Psychotic Disorder No family hx of      Respiratory No family hx of      Thyroid Disease No family hx of          PHYSICAL EXAM:  Vital signs:  There were no vitals taken for this visit.   ECO  GENERAL/CONSTITUTIONAL: No acute distress. Healthy, alert.  EYES: No scleral icterus.  No redness or discharge.    RESPIRATORY: No audible wheeze, cough, or visible cyanosis.  No visible retractions or increased work of breathing.  Able to speak fully in complete sentences.  MUSCULOSKELETAL: Normal range of motion.  NEUROLOGIC: Alert, oriented, answers questions appropriately. No tremor. Mentation intact and speech normal  INTEGUMENTARY: No jaundice.  No obvious rash or skin lesions.  PSYCHIATRIC:  Mentation appears normal, affect normal/bright, judgement and insight intact, normal speech and appearance well-groomed.    The rest of a comprehensive physical exam is deferred due to public [facility] emergency video visit restrictions.    LABS:  CBC RESULTS:   Recent Labs   Lab Test 22  0949 22  0559   WBC  --  6.5   RBC  --  4.18*   HGB  --  13.7   HCT  --  40.4   MCV  --  97   MCH  --  32.8   MCHC  --  33.9   RDW  --  12.6   * 125*       Recent Labs   Lab Test 22  1238 22  0949 22  0736 22  0559   NA  --  135  --  136   POTASSIUM  --  3.9  --  4.0   CHLORIDE  --  105  --  106   CO2  --  24  --  25   ANIONGAP  --  6  --  5   * 134*   < > 143*   BUN  --  7  --  10   CR  --  0.69  --  0.73   SHERI  --  8.5  --  8.2*    < > = values in this interval not displayed.         PATHOLOGY:    na  IMAGING:  na    ASSESSMENT/PLAN:  Jareth Limon is a 42 year old male with history of DVT and PE now on coumadin with therapeutic INR    He Also has Factor V leiden heterozygous mutation       Previously we discussed lifelong anticoagulation and that is what I would recommend. He  is doing well on the coumadin. We can get the IVC filter removed    He does have evidence of heterozygous factor V Leiden mutation however he does not have any children therefore this information may be trivial    1 DVT/PE lifelong anticoagulation unless problems with bleeding then we can readdress  2.  Removal of IVC filter  3.  See us back as needed          Lit Webb MD  Hematology/Oncology  [facility]      Again, thank you for allowing me to participate in the care of your patient.        Sincerely,        Lit Webb MD

## 2022-11-16 NOTE — NURSING NOTE
Pt states he is also taking the following medications:    Metformin 500mg twice a day.  Fenofibrate 160mg     Chaz HERBERT    Pt states he uses the [facility] in [place], [place]. I was not able to find that pharmacy.     Chaz Garrett VF

## 2022-11-16 NOTE — PROGRESS NOTES
Jareth is a 42 year old who is being evaluated via a billable video visit.      How would you like to obtain your AVS? MyChart  If the video visit is dropped, the invitation should be resent by: Text to cell phone: [phone]  Will anyone else be joining your video visit? Santa HERBERT    Video-Visit Details    Video Start Time: 1:52 PM    Type of service:  Video Visit    Video End Time:1:52 PM    Originating Location (pt. Location): Home        Distant Location (provider location):  On-site    Platform used for Video Visit: [employer]

## 2022-11-23 ENCOUNTER — TELEPHONE (OUTPATIENT)
Dept: OTHER | Facility: CLINIC | Age: 42
End: 2022-11-23

## 2022-11-23 NOTE — TELEPHONE ENCOUNTER
Dr. Webb placed order to remove IVC filter.  Contacted patient left message on VM.  Magaly Farooq RN  IR nurse clinician  [phone]

## 2022-11-28 RX ORDER — HEPARIN SODIUM 200 [USP'U]/100ML
1 INJECTION, SOLUTION INTRAVENOUS CONTINUOUS PRN
Status: CANCELLED | OUTPATIENT
Start: 2022-11-28

## 2022-11-28 NOTE — TELEPHONE ENCOUNTER
Patient returned phone call.  Scheduled for 12/8: check in at 7:30 am at [facility].  IVC filter removal with Dr. Luiza IGNACIO to up date pre-op if needed.  Home Covid test within 48 hours, need to provide proof.  Patient on Coumadin ( takes at night) do not need to hold.  Patient is a diabetic, will hold am insulin.  Written instructions mailed to patient.    Magaly Farooq RN  IR nurse clinician  [phone]

## 2022-12-08 ENCOUNTER — HOSPITAL ENCOUNTER (OUTPATIENT)
Facility: CLINIC | Age: 42
Discharge: HOME | End: 2022-12-08
Admitting: RADIOLOGY
Payer: COMMERCIAL

## 2022-12-08 ENCOUNTER — HOSPITAL ENCOUNTER (OUTPATIENT)
Dept: INTERVENTIONAL RADIOLOGY/VASCULAR | Facility: CLINIC | Age: 42
Discharge: HOME | End: 2022-12-08
Attending: INTERNAL MEDICINE | Admitting: RADIOLOGY
Payer: COMMERCIAL

## 2022-12-08 VITALS
BODY MASS INDEX: 37.3 KG/M2 | SYSTOLIC BLOOD PRESSURE: 125 MMHG | TEMPERATURE: 97 F | HEIGHT: 75 IN | HEART RATE: 86 BPM | RESPIRATION RATE: 16 BRPM | WEIGHT: 300 LBS | OXYGEN SATURATION: 100 % | DIASTOLIC BLOOD PRESSURE: 73 MMHG

## 2022-12-08 VITALS
DIASTOLIC BLOOD PRESSURE: 81 MMHG | OXYGEN SATURATION: 99 % | RESPIRATION RATE: 12 BRPM | SYSTOLIC BLOOD PRESSURE: 127 MMHG | HEART RATE: 86 BPM

## 2022-12-08 DIAGNOSIS — I26.99 PULMONARY EMBOLISM (H): ICD-10-CM

## 2022-12-08 DIAGNOSIS — I26.99 PULMONARY EMBOLISM (H): Primary | ICD-10-CM

## 2022-12-08 DIAGNOSIS — I26.92 ACUTE SADDLE PULMONARY EMBOLISM WITHOUT ACUTE COR PULMONALE (H): ICD-10-CM

## 2022-12-08 LAB
ANION GAP SERPL CALCULATED.3IONS-SCNC: 7 MMOL/L (ref 3–14)
APTT PPP: 43 SECONDS (ref 22–38)
BUN SERPL-MCNC: 16 MG/DL (ref 7–30)
CALCIUM SERPL-MCNC: 9.2 MG/DL (ref 8.5–10.1)
CHLORIDE BLD-SCNC: 102 MMOL/L (ref 94–109)
CO2 SERPL-SCNC: 24 MMOL/L (ref 20–32)
CREAT SERPL-MCNC: 0.88 MG/DL (ref 0.66–1.25)
ERYTHROCYTE [DISTWIDTH] IN BLOOD BY AUTOMATED COUNT: 13.2 % (ref 10–15)
GFR SERPL CREATININE-BSD FRML MDRD: >90 ML/MIN/1.73M2
GLUCOSE BLD-MCNC: 169 MG/DL (ref 70–99)
HCT VFR BLD AUTO: 34.8 % (ref 40–53)
HGB BLD-MCNC: 12.6 G/DL (ref 13.3–17.7)
INR PPP: 2.24 (ref 0.85–1.15)
MCH RBC QN AUTO: 30.8 PG (ref 26.5–33)
MCHC RBC AUTO-ENTMCNC: 36.2 G/DL (ref 31.5–36.5)
MCV RBC AUTO: 85 FL (ref 78–100)
PLATELET # BLD AUTO: 115 10E3/UL (ref 150–450)
POTASSIUM BLD-SCNC: 3.6 MMOL/L (ref 3.4–5.3)
RBC # BLD AUTO: 4.09 10E6/UL (ref 4.4–5.9)
SODIUM SERPL-SCNC: 133 MMOL/L (ref 133–144)
WBC # BLD AUTO: 4.8 10E3/UL (ref 4–11)

## 2022-12-08 PROCEDURE — 36415 COLL VENOUS BLD VENIPUNCTURE: CPT | Performed by: RADIOLOGY

## 2022-12-08 PROCEDURE — 272N000196 HC ACCESSORY CR5

## 2022-12-08 PROCEDURE — 250N000009 HC RX 250: Performed by: NURSE PRACTITIONER

## 2022-12-08 PROCEDURE — 85730 THROMBOPLASTIN TIME PARTIAL: CPT | Performed by: RADIOLOGY

## 2022-12-08 PROCEDURE — C1773 RET DEV, INSERTABLE: HCPCS

## 2022-12-08 PROCEDURE — 99152 MOD SED SAME PHYS/QHP 5/>YRS: CPT

## 2022-12-08 PROCEDURE — 82310 ASSAY OF CALCIUM: CPT | Performed by: RADIOLOGY

## 2022-12-08 PROCEDURE — 272N000602 HC WOUND GLUE CR1

## 2022-12-08 PROCEDURE — 85610 PROTHROMBIN TIME: CPT | Performed by: RADIOLOGY

## 2022-12-08 PROCEDURE — 250N000011 HC RX IP 250 OP 636: Performed by: RADIOLOGY

## 2022-12-08 PROCEDURE — 255N000002 HC RX 255 OP 636: Performed by: RADIOLOGY

## 2022-12-08 PROCEDURE — 250N000011 HC RX IP 250 OP 636: Performed by: NURSE PRACTITIONER

## 2022-12-08 PROCEDURE — C1769 GUIDE WIRE: HCPCS

## 2022-12-08 PROCEDURE — 999N000163 HC STATISTIC SIMPLE TUBE INSERTION/CHARGE, PORT, CATH, FISTULOGRAM

## 2022-12-08 PROCEDURE — 85027 COMPLETE CBC AUTOMATED: CPT | Performed by: RADIOLOGY

## 2022-12-08 RX ORDER — IOPAMIDOL 612 MG/ML
50 INJECTION, SOLUTION INTRAVASCULAR ONCE
Status: COMPLETED | OUTPATIENT
Start: 2022-12-08 | End: 2022-12-08

## 2022-12-08 RX ORDER — NALOXONE HYDROCHLORIDE 0.4 MG/ML
0.4 INJECTION, SOLUTION INTRAMUSCULAR; INTRAVENOUS; SUBCUTANEOUS
Status: DISCONTINUED | OUTPATIENT
Start: 2022-12-08 | End: 2022-12-09 | Stop reason: HOSPADM

## 2022-12-08 RX ORDER — NALOXONE HYDROCHLORIDE 0.4 MG/ML
0.2 INJECTION, SOLUTION INTRAMUSCULAR; INTRAVENOUS; SUBCUTANEOUS
Status: DISCONTINUED | OUTPATIENT
Start: 2022-12-08 | End: 2022-12-09 | Stop reason: HOSPADM

## 2022-12-08 RX ORDER — NALOXONE HYDROCHLORIDE 0.4 MG/ML
0.4 INJECTION, SOLUTION INTRAMUSCULAR; INTRAVENOUS; SUBCUTANEOUS
Status: CANCELLED | OUTPATIENT
Start: 2022-12-08

## 2022-12-08 RX ORDER — NALOXONE HYDROCHLORIDE 0.4 MG/ML
0.2 INJECTION, SOLUTION INTRAMUSCULAR; INTRAVENOUS; SUBCUTANEOUS
Status: CANCELLED | OUTPATIENT
Start: 2022-12-08

## 2022-12-08 RX ORDER — FENTANYL CITRATE 50 UG/ML
25-50 INJECTION, SOLUTION INTRAMUSCULAR; INTRAVENOUS EVERY 5 MIN PRN
Status: DISCONTINUED | OUTPATIENT
Start: 2022-12-08 | End: 2022-12-09 | Stop reason: HOSPADM

## 2022-12-08 RX ORDER — ACETAMINOPHEN 325 MG/1
650 TABLET ORAL
Status: DISCONTINUED | OUTPATIENT
Start: 2022-12-08 | End: 2022-12-08 | Stop reason: HOSPADM

## 2022-12-08 RX ORDER — HEPARIN SODIUM 200 [USP'U]/100ML
1 INJECTION, SOLUTION INTRAVENOUS CONTINUOUS PRN
Status: DISCONTINUED | OUTPATIENT
Start: 2022-12-08 | End: 2022-12-09 | Stop reason: HOSPADM

## 2022-12-08 RX ORDER — FLUMAZENIL 0.1 MG/ML
0.2 INJECTION, SOLUTION INTRAVENOUS
Status: CANCELLED | OUTPATIENT
Start: 2022-12-08

## 2022-12-08 RX ORDER — LIDOCAINE 40 MG/G
CREAM TOPICAL
Status: DISCONTINUED | OUTPATIENT
Start: 2022-12-08 | End: 2022-12-08 | Stop reason: HOSPADM

## 2022-12-08 RX ORDER — FENTANYL CITRATE 50 UG/ML
25-50 INJECTION, SOLUTION INTRAMUSCULAR; INTRAVENOUS EVERY 5 MIN PRN
Status: CANCELLED | OUTPATIENT
Start: 2022-12-08

## 2022-12-08 RX ORDER — SODIUM CHLORIDE 9 MG/ML
INJECTION, SOLUTION INTRAVENOUS CONTINUOUS
Status: DISCONTINUED | OUTPATIENT
Start: 2022-12-08 | End: 2022-12-08 | Stop reason: HOSPADM

## 2022-12-08 RX ORDER — FLUMAZENIL 0.1 MG/ML
0.2 INJECTION, SOLUTION INTRAVENOUS
Status: DISCONTINUED | OUTPATIENT
Start: 2022-12-08 | End: 2022-12-09 | Stop reason: HOSPADM

## 2022-12-08 RX ADMIN — IOPAMIDOL 16 ML: 612 INJECTION, SOLUTION INTRAVENOUS at 09:26

## 2022-12-08 RX ADMIN — FENTANYL CITRATE 50 MCG: 50 INJECTION INTRAMUSCULAR; INTRAVENOUS at 09:05

## 2022-12-08 RX ADMIN — LIDOCAINE HYDROCHLORIDE 6 ML: 10 INJECTION, SOLUTION INFILTRATION; PERINEURAL at 09:08

## 2022-12-08 RX ADMIN — HEPARIN SODIUM 1 BAG: 200 INJECTION, SOLUTION INTRAVENOUS at 09:00

## 2022-12-08 RX ADMIN — MIDAZOLAM HYDROCHLORIDE 1 MG: 1 INJECTION, SOLUTION INTRAMUSCULAR; INTRAVENOUS at 09:05

## 2022-12-08 ASSESSMENT — ACTIVITIES OF DAILY LIVING (ADL): ADLS_ACUITY_SCORE: 35

## 2022-12-08 NOTE — PROGRESS NOTES
Care Suites Post Procedure Note    Patient Information  Name: Jareth Limon  Age: 42 year old    Post Procedure  Time patient returned to Care Suites: 0930  Concerns/abnormal assessment: no immediate  If abnormal assessment, provider notified: N/A  Plan/Other: Continue post procedure plan of care.    IVC filter site dressing CDI.  VS stable.  Denies any pain or discomfort.  Taking po fluid well.  Pt can be discharged in one hour post sedation.    Avelina Botello RN

## 2022-12-08 NOTE — DISCHARGE INSTRUCTIONS
IVC Filter Removal Discharge Instructions - Neck    After you go home:    Have an adult stay with you until tomorrow.  Drink extra fluids for 2 days.  You may resume your normal diet.  No smoking       For 24 hours - due to the sedation you received:  Relax and take it easy.  Do NOT make any important or legal decisions.  Do NOT drive or operate machines at home or at work.  Do NOT drink alcohol.    Care of Neck Puncture Site:    For the first 24 hrs - check the puncture site every 1-2 hours while awake.  Remove the bandaid after 24 hours. If there is minor oozing, apply another bandaid and remove it after 12 hours.  It is normal to have a small bruise or soreness at the site.  You may shower tomorrow. Do NOT take a bath, or use a hot tub or pool for at least 3 days. Do NOT scrub the site. Do not use lotion or powder near the puncture site.    Activity:            For 2 days:  Do NOT do any heavy activity such as exercise, lifting, or straining.  No housework, yard work or any activity that make you sweat  Do NOT lift more than 10 pounds  Avoid heavy lifting or the overuse of your shoulder for three days.           Bleeding:    If you start bleeding from the site in your neck, sit down and press gently on the site for 10 minutes.   Once bleeding stops, sit still for 2 hours.   [facility] the [facility] as soon as you can.       Call 911 right away if you have heavy bleeding or bleeding that does not stop.      Medicines:    If you are on Metformin (Glucophage) and your GFR (kidney function level) is >30, you may continue taking your Metformin.  If you are on Metformin (Glucophage) and your GFR (kidney function level) is <30, do not restart the Metformin for 48 hours after your procedure. Check with your primary care giver before restarting the Metformin to see if you need to have blood drawn to recheck your kidney function (GFR).  If you are taking an antiplatelet medication such as Plavix, do not stop taking  it until you talk to your provider.     Take your medications, including blood thinners, unless your provider tells you not to.    If you take Coumadin (Warfarin), have your INR checked by your provider in  3-5 days. Call your clinic to schedule this.  If you have stopped any medicines, check with your provider about when to restart them.    Follow Up Appointments:    Follow up with your primary care provider as needed.    Call the clinic if:    You have increased pain or a large or growing hard lump around the site.  The site is red, swollen, hot or tender.  Blood or fluid is draining from the site.  You have chills or a fever greater than 101 F (38 C).  You have hives, a rash or unusual itching.  Any questions or concerns.        If you have questions or your original symptoms do not improve, call:        [facility] @ [phone]

## 2022-12-08 NOTE — PROGRESS NOTES
Care Suites Admission Nursing Note    Patient Information  Name: Jareth Limon  Age: 42 year old  Reason for admission: filter removel  Care Suites arrival time:     Patient Admission/Assessment   Pre-procedure assessment complete: Yes  If abnormal assessment/labs, provider notified: Yes  NPO: Yes  Medications held per instructions/orders: N/A  Consent: obtained  If applicable, pregnancy test status: deferred  Patient oriented to room: Yes  Education/questions answered: Yes      Discharge Planning  Discharge name/phone number:  [phone]  Overnight post sedation caregiver: don  Discharge location: [place]    Pushpa Mejia RN

## 2022-12-08 NOTE — PRE-PROCEDURE
GENERAL PRE-PROCEDURE:   Procedure:  Venogram, Temporary inferior vena cava filter removal with moderate sedation  Date/Time:  12/8/2022 8:37 AM    Written consent obtained?: Yes    Risks and benefits: Risks, benefits and alternatives were discussed    Consent given by:  Patient  Patient states understanding of procedure being performed: Yes    Patient's understanding of procedure matches consent: Yes    Procedure consent matches procedure scheduled: Yes    Expected level of sedation:  Moderate  Appropriately NPO:  Yes  ASA Class:  3  Mallampati  :  Grade 3- soft palate visible, posterior pharyngeal wall not visible  Lungs:  Lungs clear with good breath sounds bilaterally and other (comment)  Lung exam comment:  Decreased left base  Heart:  Normal heart sounds and rate  History & Physical reviewed:  History and physical reviewed and no updates needed  Statement of review:  I have reviewed the lab findings, diagnostic data, medications, and the plan for sedation

## 2022-12-08 NOTE — PROGRESS NOTES
Care Suites Discharge Nursing Note    Patient Information  Name: Jareth Limon  Age: 42 year old    Discharge Education:  Discharge instructions reviewed: Yes  Additional education/resources provided: NA  Patient/patient representative verbalizes understanding: Yes  Patient discharging on new medications: No  Medication education completed: N/A    Discharge Plans:   Discharge location: home  Discharge ride contacted: Yes  Approximate discharge time: 1005    Discharge Criteria:  Discharge criteria met and vital signs stable: Yes    Patient Belongs:  Patient belongings returned to patient: Yes    Avelina Botello RN

## 2022-12-08 NOTE — IR NOTE
Interventional Radiology Intra-procedural Nursing Note    Patient Name: Jareth Limon  Medical Record Number: [id number]  Today's Date: December 8, 2022    Procedure: Venogram with IVC filter removal and moderate sedation  Start time: 0907  End time: 0915  Report provided to: Pushpa DONALDSON  Patient depart time and location: 0920 to Care Suites 12    Note: Patient entered Interventional Radiology Suite number 1 via cart. Patient awake, alert and orientated. Assisted onto procedural table in supine position. Prepped and draped.  Dr. Jarrett in room. Time out and procedure started. Vital signs stable. Telemetry reading NSR.    Procedure well tolerated by patient without complications. Procedure end with debrief by Dr. Maldonado.      Administered medication totals:  Lidocaine 1% 6 mL Intradermal  Versed 1 mg IVP  Fentanyl 50 mcg IVP    Last dose of sedation administered at 0905.

## (undated) RX ORDER — FENTANYL CITRATE 50 UG/ML
INJECTION, SOLUTION INTRAMUSCULAR; INTRAVENOUS
Status: DISPENSED
Start: 2022-08-19

## (undated) RX ORDER — HEPARIN SODIUM 200 [USP'U]/100ML
INJECTION, SOLUTION INTRAVENOUS
Status: DISPENSED
Start: 2022-08-19

## (undated) RX ORDER — HEPARIN SODIUM 1000 [USP'U]/ML
INJECTION, SOLUTION INTRAVENOUS; SUBCUTANEOUS
Status: DISPENSED
Start: 2022-08-19

## (undated) RX ORDER — FENTANYL CITRATE 50 UG/ML
INJECTION, SOLUTION INTRAMUSCULAR; INTRAVENOUS
Status: DISPENSED
Start: 2022-12-08

## (undated) RX ORDER — LIDOCAINE HYDROCHLORIDE 10 MG/ML
INJECTION, SOLUTION INFILTRATION; PERINEURAL
Status: DISPENSED
Start: 2022-08-19

## (undated) RX ORDER — HEPARIN SODIUM 200 [USP'U]/100ML
INJECTION, SOLUTION INTRAVENOUS
Status: DISPENSED
Start: 2022-12-08